# Patient Record
Sex: FEMALE | Race: WHITE | Employment: OTHER | URBAN - METROPOLITAN AREA
[De-identification: names, ages, dates, MRNs, and addresses within clinical notes are randomized per-mention and may not be internally consistent; named-entity substitution may affect disease eponyms.]

---

## 2017-05-04 ENCOUNTER — HOSPITAL ENCOUNTER (OUTPATIENT)
Dept: LAB | Age: 77
Discharge: HOME | End: 2017-05-04

## 2017-09-14 ENCOUNTER — HOSPITAL ENCOUNTER (OUTPATIENT)
Dept: LAB | Age: 77
Discharge: HOME | End: 2017-09-14

## 2017-10-24 ENCOUNTER — HOSPITAL ENCOUNTER (OUTPATIENT)
Dept: MAMMOGRAPHY | Age: 77
Discharge: HOME | End: 2017-10-24
Attending: INTERNAL MEDICINE
Payer: MEDICARE

## 2017-10-24 DIAGNOSIS — Z12.31 VISIT FOR SCREENING MAMMOGRAM: ICD-10-CM

## 2017-10-24 PROCEDURE — 77067 SCR MAMMO BI INCL CAD: CPT

## 2018-03-29 ENCOUNTER — HOSPITAL ENCOUNTER (OUTPATIENT)
Dept: LAB | Age: 78
Discharge: HOME | End: 2018-03-29

## 2018-07-06 ENCOUNTER — HOSPITAL ENCOUNTER (OUTPATIENT)
Dept: LAB | Age: 78
Discharge: HOME | End: 2018-07-06

## 2018-10-16 ENCOUNTER — HOSPITAL ENCOUNTER (OUTPATIENT)
Dept: MAMMOGRAPHY | Age: 78
Discharge: HOME | End: 2018-10-16
Attending: INTERNAL MEDICINE
Payer: MEDICARE

## 2018-10-16 DIAGNOSIS — Z12.31 VISIT FOR SCREENING MAMMOGRAM: ICD-10-CM

## 2018-10-16 PROCEDURE — 77063 BREAST TOMOSYNTHESIS BI: CPT

## 2019-02-07 ENCOUNTER — HOSPITAL ENCOUNTER (OUTPATIENT)
Dept: LAB | Age: 79
Discharge: HOME | End: 2019-02-07

## 2019-05-20 ENCOUNTER — HOSPITAL ENCOUNTER (OUTPATIENT)
Age: 79
Setting detail: OUTPATIENT SURGERY
Discharge: HOME | End: 2019-05-20
Attending: SURGERY | Admitting: SURGERY
Payer: MEDICARE

## 2019-05-20 ENCOUNTER — ANESTHESIA EVENT (OUTPATIENT)
Dept: MEDSURG UNIT | Age: 79
End: 2019-05-20
Payer: MEDICARE

## 2019-05-20 ENCOUNTER — ANESTHESIA (OUTPATIENT)
Dept: MEDSURG UNIT | Age: 79
End: 2019-05-20
Payer: MEDICARE

## 2019-05-20 VITALS
RESPIRATION RATE: 15 BRPM | HEART RATE: 57 BPM | OXYGEN SATURATION: 96 % | HEIGHT: 63 IN | WEIGHT: 106 LBS | BODY MASS INDEX: 18.78 KG/M2 | DIASTOLIC BLOOD PRESSURE: 50 MMHG | TEMPERATURE: 97.7 F | SYSTOLIC BLOOD PRESSURE: 141 MMHG

## 2019-05-20 PROCEDURE — 77030020782 HC GWN BAIR PAWS FLX 3M -B

## 2019-05-20 PROCEDURE — 74011000250 HC RX REV CODE- 250

## 2019-05-20 PROCEDURE — 76030000000 HC AMB SURG OR TIME 0.5 TO 1: Performed by: SURGERY

## 2019-05-20 PROCEDURE — 74011250636 HC RX REV CODE- 250/636

## 2019-05-20 PROCEDURE — 88305 TISSUE EXAM BY PATHOLOGIST: CPT

## 2019-05-20 PROCEDURE — 88331 PATH CONSLTJ SURG 1 BLK 1SPC: CPT

## 2019-05-20 PROCEDURE — 76210000035 HC AMBSU PH I REC 1 TO 1.5 HR: Performed by: SURGERY

## 2019-05-20 PROCEDURE — 74011000250 HC RX REV CODE- 250: Performed by: SURGERY

## 2019-05-20 PROCEDURE — 77030031139 HC SUT VCRL2 J&J -A: Performed by: SURGERY

## 2019-05-20 PROCEDURE — 74011250637 HC RX REV CODE- 250/637: Performed by: ANESTHESIOLOGY

## 2019-05-20 PROCEDURE — 74011250636 HC RX REV CODE- 250/636: Performed by: ANESTHESIOLOGY

## 2019-05-20 PROCEDURE — 77030011640 HC PAD GRND REM COVD -A: Performed by: SURGERY

## 2019-05-20 PROCEDURE — 77030002996 HC SUT SLK J&J -A: Performed by: SURGERY

## 2019-05-20 PROCEDURE — 77030018836 HC SOL IRR NACL ICUM -A: Performed by: SURGERY

## 2019-05-20 PROCEDURE — 76060000061 HC AMB SURG ANES 0.5 TO 1 HR: Performed by: SURGERY

## 2019-05-20 RX ORDER — FENTANYL CITRATE 50 UG/ML
25 INJECTION, SOLUTION INTRAMUSCULAR; INTRAVENOUS
Status: DISCONTINUED | OUTPATIENT
Start: 2019-05-20 | End: 2019-05-20 | Stop reason: HOSPADM

## 2019-05-20 RX ORDER — ONDANSETRON 2 MG/ML
INJECTION INTRAMUSCULAR; INTRAVENOUS AS NEEDED
Status: DISCONTINUED | OUTPATIENT
Start: 2019-05-20 | End: 2019-05-20 | Stop reason: HOSPADM

## 2019-05-20 RX ORDER — SODIUM CHLORIDE, SODIUM LACTATE, POTASSIUM CHLORIDE, CALCIUM CHLORIDE 600; 310; 30; 20 MG/100ML; MG/100ML; MG/100ML; MG/100ML
50 INJECTION, SOLUTION INTRAVENOUS CONTINUOUS
Status: DISCONTINUED | OUTPATIENT
Start: 2019-05-20 | End: 2019-05-20 | Stop reason: HOSPADM

## 2019-05-20 RX ORDER — PROPOFOL 10 MG/ML
INJECTION, EMULSION INTRAVENOUS AS NEEDED
Status: DISCONTINUED | OUTPATIENT
Start: 2019-05-20 | End: 2019-05-20 | Stop reason: HOSPADM

## 2019-05-20 RX ORDER — SODIUM CHLORIDE 0.9 % (FLUSH) 0.9 %
5-40 SYRINGE (ML) INJECTION EVERY 8 HOURS
Status: DISCONTINUED | OUTPATIENT
Start: 2019-05-20 | End: 2019-05-20 | Stop reason: HOSPADM

## 2019-05-20 RX ORDER — LIDOCAINE HYDROCHLORIDE AND EPINEPHRINE 10; 10 MG/ML; UG/ML
INJECTION, SOLUTION INFILTRATION; PERINEURAL AS NEEDED
Status: DISCONTINUED | OUTPATIENT
Start: 2019-05-20 | End: 2019-05-20 | Stop reason: HOSPADM

## 2019-05-20 RX ORDER — MORPHINE SULFATE 10 MG/ML
2 INJECTION, SOLUTION INTRAMUSCULAR; INTRAVENOUS
Status: DISCONTINUED | OUTPATIENT
Start: 2019-05-20 | End: 2019-05-20 | Stop reason: HOSPADM

## 2019-05-20 RX ORDER — BACITRACIN 500 [USP'U]/G
OINTMENT TOPICAL AS NEEDED
Status: DISCONTINUED | OUTPATIENT
Start: 2019-05-20 | End: 2019-05-20 | Stop reason: HOSPADM

## 2019-05-20 RX ORDER — CEFAZOLIN SODIUM 1 G/3ML
INJECTION, POWDER, FOR SOLUTION INTRAMUSCULAR; INTRAVENOUS AS NEEDED
Status: DISCONTINUED | OUTPATIENT
Start: 2019-05-20 | End: 2019-05-20 | Stop reason: HOSPADM

## 2019-05-20 RX ORDER — FENTANYL CITRATE 50 UG/ML
INJECTION, SOLUTION INTRAMUSCULAR; INTRAVENOUS AS NEEDED
Status: DISCONTINUED | OUTPATIENT
Start: 2019-05-20 | End: 2019-05-20 | Stop reason: HOSPADM

## 2019-05-20 RX ORDER — SODIUM CHLORIDE, SODIUM LACTATE, POTASSIUM CHLORIDE, CALCIUM CHLORIDE 600; 310; 30; 20 MG/100ML; MG/100ML; MG/100ML; MG/100ML
INJECTION, SOLUTION INTRAVENOUS
Status: DISCONTINUED | OUTPATIENT
Start: 2019-05-20 | End: 2019-05-20 | Stop reason: HOSPADM

## 2019-05-20 RX ORDER — HYDROMORPHONE HYDROCHLORIDE 1 MG/ML
0.2 INJECTION, SOLUTION INTRAMUSCULAR; INTRAVENOUS; SUBCUTANEOUS
Status: DISCONTINUED | OUTPATIENT
Start: 2019-05-20 | End: 2019-05-20 | Stop reason: HOSPADM

## 2019-05-20 RX ORDER — MIDAZOLAM HYDROCHLORIDE 1 MG/ML
1 INJECTION, SOLUTION INTRAMUSCULAR; INTRAVENOUS AS NEEDED
Status: DISCONTINUED | OUTPATIENT
Start: 2019-05-20 | End: 2019-05-20 | Stop reason: HOSPADM

## 2019-05-20 RX ORDER — DEXMEDETOMIDINE HYDROCHLORIDE 4 UG/ML
INJECTION, SOLUTION INTRAVENOUS AS NEEDED
Status: DISCONTINUED | OUTPATIENT
Start: 2019-05-20 | End: 2019-05-20 | Stop reason: HOSPADM

## 2019-05-20 RX ORDER — SODIUM CHLORIDE 0.9 % (FLUSH) 0.9 %
5-40 SYRINGE (ML) INJECTION AS NEEDED
Status: DISCONTINUED | OUTPATIENT
Start: 2019-05-20 | End: 2019-05-20 | Stop reason: HOSPADM

## 2019-05-20 RX ORDER — ONDANSETRON 2 MG/ML
4 INJECTION INTRAMUSCULAR; INTRAVENOUS AS NEEDED
Status: DISCONTINUED | OUTPATIENT
Start: 2019-05-20 | End: 2019-05-20 | Stop reason: HOSPADM

## 2019-05-20 RX ORDER — CEFAZOLIN SODIUM/WATER 2 G/20 ML
2 SYRINGE (ML) INTRAVENOUS ONCE
Status: DISCONTINUED | OUTPATIENT
Start: 2019-05-20 | End: 2019-05-20 | Stop reason: HOSPADM

## 2019-05-20 RX ORDER — HYDRALAZINE HYDROCHLORIDE 20 MG/ML
5 INJECTION INTRAMUSCULAR; INTRAVENOUS ONCE
Status: COMPLETED | OUTPATIENT
Start: 2019-05-20 | End: 2019-05-20

## 2019-05-20 RX ORDER — TETRACAINE HYDROCHLORIDE 5 MG/ML
SOLUTION OPHTHALMIC AS NEEDED
Status: DISCONTINUED | OUTPATIENT
Start: 2019-05-20 | End: 2019-05-20 | Stop reason: HOSPADM

## 2019-05-20 RX ORDER — PROPOFOL 10 MG/ML
INJECTION, EMULSION INTRAVENOUS
Status: DISCONTINUED | OUTPATIENT
Start: 2019-05-20 | End: 2019-05-20 | Stop reason: HOSPADM

## 2019-05-20 RX ORDER — DEXAMETHASONE SODIUM PHOSPHATE 4 MG/ML
INJECTION, SOLUTION INTRA-ARTICULAR; INTRALESIONAL; INTRAMUSCULAR; INTRAVENOUS; SOFT TISSUE AS NEEDED
Status: DISCONTINUED | OUTPATIENT
Start: 2019-05-20 | End: 2019-05-20 | Stop reason: HOSPADM

## 2019-05-20 RX ORDER — ACETAMINOPHEN 325 MG/1
975 TABLET ORAL ONCE
Status: COMPLETED | OUTPATIENT
Start: 2019-05-20 | End: 2019-05-20

## 2019-05-20 RX ORDER — LIDOCAINE HYDROCHLORIDE 10 MG/ML
0.1 INJECTION, SOLUTION EPIDURAL; INFILTRATION; INTRACAUDAL; PERINEURAL AS NEEDED
Status: DISCONTINUED | OUTPATIENT
Start: 2019-05-20 | End: 2019-05-20 | Stop reason: HOSPADM

## 2019-05-20 RX ADMIN — DEXMEDETOMIDINE HYDROCHLORIDE 4 MCG: 4 INJECTION, SOLUTION INTRAVENOUS at 10:42

## 2019-05-20 RX ADMIN — DEXAMETHASONE SODIUM PHOSPHATE 4 MG: 4 INJECTION, SOLUTION INTRA-ARTICULAR; INTRALESIONAL; INTRAMUSCULAR; INTRAVENOUS; SOFT TISSUE at 11:09

## 2019-05-20 RX ADMIN — PROPOFOL 50 MG: 10 INJECTION, EMULSION INTRAVENOUS at 10:56

## 2019-05-20 RX ADMIN — FENTANYL CITRATE 25 MCG: 50 INJECTION, SOLUTION INTRAMUSCULAR; INTRAVENOUS at 10:46

## 2019-05-20 RX ADMIN — ACETAMINOPHEN 975 MG: 325 TABLET ORAL at 09:38

## 2019-05-20 RX ADMIN — SODIUM CHLORIDE, SODIUM LACTATE, POTASSIUM CHLORIDE, CALCIUM CHLORIDE: 600; 310; 30; 20 INJECTION, SOLUTION INTRAVENOUS at 10:39

## 2019-05-20 RX ADMIN — PROPOFOL 50 MCG/KG/MIN: 10 INJECTION, EMULSION INTRAVENOUS at 10:48

## 2019-05-20 RX ADMIN — DEXMEDETOMIDINE HYDROCHLORIDE 2 MCG: 4 INJECTION, SOLUTION INTRAVENOUS at 11:08

## 2019-05-20 RX ADMIN — PROPOFOL 20 MG: 10 INJECTION, EMULSION INTRAVENOUS at 11:06

## 2019-05-20 RX ADMIN — HYDRALAZINE HYDROCHLORIDE 5 MG: 20 INJECTION INTRAMUSCULAR; INTRAVENOUS at 10:01

## 2019-05-20 RX ADMIN — FENTANYL CITRATE 25 MCG: 50 INJECTION, SOLUTION INTRAMUSCULAR; INTRAVENOUS at 10:50

## 2019-05-20 RX ADMIN — DEXMEDETOMIDINE HYDROCHLORIDE 4 MCG: 4 INJECTION, SOLUTION INTRAVENOUS at 10:40

## 2019-05-20 RX ADMIN — PROPOFOL 75 MCG/KG/MIN: 10 INJECTION, EMULSION INTRAVENOUS at 11:07

## 2019-05-20 RX ADMIN — PROPOFOL 20 MG: 10 INJECTION, EMULSION INTRAVENOUS at 11:03

## 2019-05-20 RX ADMIN — DEXMEDETOMIDINE HYDROCHLORIDE 2 MCG: 4 INJECTION, SOLUTION INTRAVENOUS at 10:55

## 2019-05-20 RX ADMIN — DEXMEDETOMIDINE HYDROCHLORIDE 4 MCG: 4 INJECTION, SOLUTION INTRAVENOUS at 10:59

## 2019-05-20 RX ADMIN — PROPOFOL 20 MG: 10 INJECTION, EMULSION INTRAVENOUS at 10:47

## 2019-05-20 RX ADMIN — DEXMEDETOMIDINE HYDROCHLORIDE 4 MCG: 4 INJECTION, SOLUTION INTRAVENOUS at 10:44

## 2019-05-20 RX ADMIN — ONDANSETRON 4 MG: 2 INJECTION INTRAMUSCULAR; INTRAVENOUS at 11:09

## 2019-05-20 RX ADMIN — CEFAZOLIN SODIUM 2 G: 1 INJECTION, POWDER, FOR SOLUTION INTRAMUSCULAR; INTRAVENOUS at 10:57

## 2019-05-20 NOTE — BRIEF OP NOTE
BRIEF OPERATIVE NOTE Date of Procedure: 5/20/2019 Preoperative Diagnosis: BASAL CELL CARCINOMA RIGHT LOWER EYELID Postoperative Diagnosis: BASAL CELL CARCINOMA RIGHT LOWER EYELID Procedure(s): EXCISION BASAL CELL CARCINOMA RIGHT LOWER EYELID MARGIN  WITH   FROZEN SECTIONS/RECONSTRUCTION WITH LATERAL CANTHOTOMY PEDICLE FLAP CLOSURE Surgeon(s) and Role: * Rebecca Reyes MD - Primary Surgical Assistant: Rui Chandler Surgical Staff: 
Circ-1: Yana Oconnor RN Scrub Tech-1: Suzzane Kawasaki Event Time In Time Out Incision Start  Incision Close 1121 Anesthesia: MAC Estimated Blood Loss: minimal 
Specimens:  
ID Type Source Tests Collected by Time Destination 1 : BCCA RIGHT LOWER LID Frozen Section Tissue  Rebecca Reyes MD 5/20/2019 1392 Pathology Findings: nodular BCCA with touching lateral margin at skin level, but this was wear the skin tore taking at the tumor. Gross lateral margin clear by 2 mm. Tarsal margin clear. Complications: none Implants: none

## 2019-05-20 NOTE — ROUTINE PROCESS
Patient: Kimberley Mancilla MRN: [id number]  SSN: [id number] YOB: 1940  Age: 78 y.o. Sex: female Patient is status post Procedure(s): EXCISION BASAL CELL CARCINOMA RIGHT LOWER EYELID MARGINS WITH FULL THICKNESS SKIN GRAFT AND FROZEN SECTIONS/RECONSTRUCTION WITH LATERAL CANTHOTOMY PEDICLE FLAP CLOSURE. Surgeon(s) and Role: * Dina Lerma MD - Primary Local/Dose/Irrigation:  SEE MAR Dressing/Packing:      
Splint/Cast:  ] Other:

## 2019-05-20 NOTE — ANESTHESIA POSTPROCEDURE EVALUATION
Probably left corneal abrasion. Left sclera red and eye teary after colonoscopy. ordered Dex/tobra ointment to left eye and tylenol. Told her if symptoms do not improve in 2 days then come to ER. Post-Anesthesia Evaluation and Assessment Patient: Marybel Hager MRN: [id number]  SSN: [id number] YOB: 1940  Age: 78 y.o. Sex: female I have evaluated the patient and they are stable and ready for discharge from the PACU. Cardiovascular Function/Vital Signs Visit Vitals /41 Pulse (!) 52 Temp 36.5 °C (97.7 °F) Resp 15 Ht 5' 3\" (1.6 m) Wt 48.1 kg (106 lb) SpO2 99% BMI 18.78 kg/m² Patient is status post MAC anesthesia for Procedure(s): EXCISION BASAL CELL CARCINOMA RIGHT LOWER EYELID MARGIN  WITH   FROZEN SECTIONS/RECONSTRUCTION WITH LATERAL CANTHOTOMY PEDICLE FLAP CLOSURE. Nausea/Vomiting: None Postoperative hydration reviewed and adequate. Pain: 
Pain Scale 1: Numeric (0 - 10) (05/20/19 1145) Pain Intensity 1: 0 (05/20/19 1145) Managed Neurological Status:  
Neuro (WDL): Exceptions to WDL (05/20/19 1145) Neuro Neurologic State: Anesthetized (05/20/19 1145) At baseline Mental Status, Level of Consciousness: Alert and  oriented to person, place, and time Pulmonary Status:  
O2 Device: Nasal cannula (05/20/19 1200) Adequate oxygenation and airway patent Complications related to anesthesia: None Post-anesthesia assessment completed. No concerns Signed By: Camden Hill MD   
 May 20, 2019 Procedure(s): EXCISION BASAL CELL CARCINOMA RIGHT LOWER EYELID MARGIN  WITH   FROZEN SECTIONS/RECONSTRUCTION WITH LATERAL CANTHOTOMY PEDICLE FLAP CLOSURE. MAC 
 
<BSHSIANPOST> Vitals Value Taken Time /44 5/20/2019 12:15 PM  
Temp 36.5 °C (97.7 °F) 5/20/2019 11:50 AM  
Pulse 60 5/20/2019 12:27 PM  
Resp 14 5/20/2019 12:27 PM  
SpO2 98 % 5/20/2019 12:27 PM  
Vitals shown include unvalidated device data.

## 2019-05-20 NOTE — H&P
Pre-op History and Physical 
 
CC: BASAL CELL CARCINOMA HPI: 78y.o. year old female with BASAL CELL CARCINOMA for Procedure(s): EXCISION BASAL CELL CARCINOMA RIGHT LOWER EYELID MARGINS WITH FULL THICKNESS SKIN GRAFT AND FROZEN SECTIONS/RECONSTRUCTION WITH LATERAL CANTHOTOMY PEDICLE FLAP CLOSURE. Past medical history:  
Past Medical History:  
Diagnosis Date  Cancer ([facility] BCCA; [facility]  High cholesterol  History of colon polyps  HTN (hypertension) 7/16/2013  IBS (irritable bowel syndrome)  Psychiatric disorder HX PANIC ATTACKS  PVD (peripheral vascular disease) ([facility] 7/16/2013 Past surgical history:  
Past Surgical History:  
Procedure Laterality Date  HX BREAST BIOPSY Bilateral   
 Surgical Bx - Many yrs ago per pt. - BENIGN  
 HX CATARACT REMOVAL Bilateral   
 HX COLONOSCOPY SEVERAL  
 HX TONSILLECTOMY  HX TOTAL ABDOMINAL HYSTERECTOMY    
 only  VASCULAR SURGERY PROCEDURE UNLIST Right 12/2013 RIGHT LOWER LEG STENT  VASCULAR SURGERY PROCEDURE UNLIST Right 7-2014  
 bypass graft right leg Family history:  
Family History Problem Relation Age of Onset  Cancer Son BURKITT LYMPHOMA  Hypertension Mother  Cancer Father COLON  
 Diabetes Maternal Uncle  Cancer Sister SKIN  
 Stroke Sister  Cancer Sister SKIN  
 Anesth Problems Neg Hx Social history:  
Social History Socioeconomic History  Marital status:  Spouse name: Not on file  Number of children: Not on file  Years of education: Not on file  Highest education level: Not on file Occupational History  Not on file Social Needs  Financial resource strain: Not on file  Food insecurity:  
  Worry: Not on file Inability: Not on file  Transportation needs:  
  Medical: Not on file Non-medical: Not on file Tobacco Use  Smoking status: Current Every Day Smoker Packs/day: 0.50 Years: 35.00 Pack years: 17.50  Smokeless tobacco: Never Used Substance and Sexual Activity  Alcohol use: Yes Comment: VERY OCC  Drug use: No  
 Sexual activity: Not on file Lifestyle  Physical activity:  
  Days per week: Not on file Minutes per session: Not on file  Stress: Not on file Relationships  Social connections:  
  Talks on phone: Not on file Gets together: Not on file Attends Anabaptism service: Not on file Active member of club or organization: Not on file Attends meetings of clubs or organizations: Not on file Relationship status: Not on file  Intimate partner violence:  
  Fear of current or ex partner: Not on file Emotionally abused: Not on file Physically abused: Not on file Forced sexual activity: Not on file Other Topics Concern  Not on file Social History Narrative ** Merged History Encounter ** Home Medications:  
Prior to Admission medications Medication Sig Start Date End Date Taking? Authorizing Provider  
losartan (COZAAR) 50 mg tablet Take 1 Tab by mouth daily. 4/10/19  Yes Aung Hu MD  
atorvastatin (LIPITOR) 10 mg tablet TAKE 1 TABLET DAILY 1/30/19  Yes Aung Hu MD  
aspirin (ASPIRIN) 325 mg tablet Take 325 mg by mouth nightly. Provider, Historical  
  
Allergies: Allergies Allergen Reactions  Ace Inhibitors Cough Patient states that she is \"unaware of this allergy\"  Indocin [Indomethacin] Palpitations  Indocin [Indomethacin] Unknown (comments)  Nsaids (Non-Steroidal Anti-Inflammatory Drug) Other (comments) \"Make her feel funny\"  Sulfa (Sulfonamide Antibiotics) Palpitations  Sulfa Dyne Other (comments) ALMOST PASSED OUT, pt not aware of this allergy Review of systems:  Denies headache, fever, chills, weight change, congestion, sore throat, chest pain, shortness of breath, nausea, vomiting, diarrhea, constipation, abdominal pain, generalized weakness, muscle or joint pain, and rash. Physical Exam: 
Vitals: Blood pressure 192/73, pulse 68, temperature 97.9 °F (36.6 °C), resp. rate 18, height 5' 3\" (1.6 m), weight 48.1 kg (106 lb), SpO2 98 %. General: awake and alert, NAD Neck: supple Breasts:  no known breast pathology Cor: RRR Lungs: clear Abdomen: soft, non-tender, non-distended Extremities: no edema Skin: basal cell carcinoma of right lower eyelid. Obvious based on exam. 
 
Impression: BASAL CELL CARCINOMA Plan:  Procedure(s): EXCISION BASAL CELL CARCINOMA RIGHT LOWER EYELID MARGINS WITH FULL THICKNESS SKIN GRAFT AND FROZEN SECTIONS/RECONSTRUCTION WITH LATERAL CANTHOTOMY PEDICLE FLAP CLOSURE

## 2019-05-20 NOTE — DISCHARGE INSTRUCTIONS
Parish Harrington M.D., F.CLIVE.C.S. Hayde Kelly M.D., RONALD.CLIVE.C.S.,  Elva Clancy III, M.D., F.A.C.S. Sudhir Hebert M.D.,  Ratna Sidhu M.D. Instructions after Minor Plastic Surgery Procedures      You have had a minor surgical procedure. Please follow these simple instructions to help ensure a safe and comfortable recovery. Any questions can be directed to the office at [phone]. 1. If a bandage has been placed, it can be removed or changed at 24-48 hours after surgery. Wounds of the scalp are not usually bandaged, except in special situations. 2. Expect a modest amount of redness (inflammation) and possibly some bruising to appear in the days following your surgery. This is normal and will resolve as the wound heals, but may persist until the stitches have been removed. 3. The appearance of excessive wound redness, pus or fever is not normal and should be reported to the office. 4. Wounds may be gently washed with mild soap and water beginning 24 hours after surgery, then patted dry. Scalp wounds may be gently washed (mild shampoo) and gently dried as well. 5. Antibiotic eye ointment should be lightly applied 2-3 times per day to your incision. Replace Band-Aid or other dressing as instructed. 6. Suture removal will be arranged in 5-14 days, depending upon the site. The pathologists report will be discussed with you then. Your appointment is ___Saturday at the [facility] Office____________________. 7. Mild to moderate pain is to be expected after minor surgery and will generally be relieved by the use of Tylenol or ibuprofen agents (Advil, Motrin, Nuprin, etc.)     8. Swelling or discomfort will be improved by elevating the surgical site above the level of the heart, especially the face, scalp or arms, which can be elevated in bed by extra pillows.     9. Do not be concerned if one or even several sutures come out prior to the time of suture removal. It is not unusual for this to occur as the wound swelling decreases. Support of the remaining sutures is sufficient to protect your wound(s). 10. If appropriate, copies of the surgery and pathology reports will be sent to your doctor. 11. Please call the office at [phone] if you have any questions. I acknowledge receipt of the above discharge instructions:    Patient/Guardian Signature _______________________________________ Date___________________    Dahiana Ramirez Signature_______________________________________________ Date___________________      DISCHARGE SUMMARY from Nurse    PATIENT INSTRUCTIONS:    After general anesthesia or intravenous sedation, for 24 hours or while taking prescription Narcotics:  · Limit your activities  · Do not drive and operate hazardous machinery  · Do not make important personal or business decisions  · Do  not drink alcoholic beverages  · If you have not urinated within 8 hours after discharge, please contact your surgeon on call. Report the following to your surgeon:  · Excessive pain, swelling, redness or odor of or around the surgical area  · Temperature over 100.5  · Nausea and vomiting lasting longer than 4 hours or if unable to take medications  · Any signs of decreased circulation or nerve impairment to extremity: change in color, persistent  numbness, tingling, coldness or increase pain  · Any questions    What to do at Home:  Recommended activity: See surgical instructions. If you experience any of the following symptoms as noted above, please follow up with Dr. Lety Monet. *  Please give a list of your current medications to your Primary Care Provider. *  Please update this list whenever your medications are discontinued, doses are      changed, or new medications (including over-the-counter products) are added. *  Please carry medication information at all times in case of emergency situations.     These are general instructions for a healthy lifestyle:    No smoking/ No tobacco products/ Avoid exposure to second hand smoke  Surgeon General's Warning:  Quitting smoking now greatly reduces serious risk to your health. Obesity, smoking, and sedentary lifestyle greatly increases your risk for illness    A healthy diet, regular physical exercise & weight monitoring are important for maintaining a healthy lifestyle    You may be retaining fluid if you have a history of heart failure or if you experience any of the following symptoms:  Weight gain of 3 pounds or more overnight or 5 pounds in a week, increased swelling in our hands or feet or shortness of breath while lying flat in bed. Please call your doctor as soon as you notice any of these symptoms; do not wait until your next office visit. Recognize signs and symptoms of STROKE:    F-face looks uneven    A-arms unable to move or move unevenly    S-speech slurred or non-existent    T-time-call 911 as soon as signs and symptoms begin-DO NOT go       Back to bed or wait to see if you get better-TIME IS BRAIN. Warning Signs of HEART ATTACK     Call 911 if you have these symptoms:   Chest discomfort. Most heart attacks involve discomfort in the center of the chest that lasts more than a few minutes, or that goes away and comes back. It can feel like uncomfortable pressure, squeezing, fullness, or pain.  Discomfort in other areas of the upper body. Symptoms can include pain or discomfort in one or both arms, the back, neck, jaw, or stomach.  Shortness of breath with or without chest discomfort.  Other signs may include breaking out in a cold sweat, nausea, or lightheadedness. Don't wait more than five minutes to call 911 - MINUTES MATTER! Fast action can save your life. Calling 911 is almost always the fastest way to get lifesaving treatment. Emergency Medical Services staff can begin treatment when they arrive -- up to an hour sooner than if someone gets to the hospital by car.      The discharge information has been reviewed with the patient and caregiver. The patient and caregiver verbalized understanding. Discharge medications reviewed with the patient and caregiver and appropriate educational materials and side effects teaching were provided.   ___________________________________________________________________________________________________________________________________

## 2019-05-20 NOTE — ANESTHESIA PREPROCEDURE EVALUATION
Relevant Problems No relevant active problems Anesthetic History No history of anesthetic complications Review of Systems / Medical History Patient summary reviewed, nursing notes reviewed and pertinent labs reviewed Pulmonary Within defined limits Neuro/Psych Psychiatric history Cardiovascular Hypertension Exercise tolerance: >4 METS 
  
GI/Hepatic/Renal 
Within defined limits Endo/Other Within defined limits Other Findings Physical Exam 
 
Airway Mallampati: I 
TM Distance: > 6 cm Neck ROM: normal range of motion Mouth opening: Normal 
 
 Cardiovascular Rhythm: regular Rate: normal 
 
 
 
 Dental 
No notable dental hx Pulmonary Breath sounds clear to auscultation Abdominal 
 
 
 
 Other Findings Anesthetic Plan ASA: 2 Anesthesia type: MAC Induction: Intravenous Anesthetic plan and risks discussed with: Patient

## 2019-10-04 PROBLEM — Z72.0 TOBACCO ABUSE: Status: ACTIVE | Noted: 2019-10-04

## 2019-10-17 ENCOUNTER — HOSPITAL ENCOUNTER (OUTPATIENT)
Dept: MAMMOGRAPHY | Age: 79
Discharge: HOME | End: 2019-10-17
Attending: INTERNAL MEDICINE
Payer: MEDICARE

## 2019-10-17 DIAGNOSIS — Z12.31 VISIT FOR SCREENING MAMMOGRAM: ICD-10-CM

## 2019-10-17 PROCEDURE — 77067 SCR MAMMO BI INCL CAD: CPT

## 2020-01-03 RX ORDER — ASPIRIN 81 MG/1
81 TABLET ORAL DAILY
COMMUNITY

## 2020-01-06 ENCOUNTER — HOSPITAL ENCOUNTER (OUTPATIENT)
Age: 80
Setting detail: OUTPATIENT SURGERY
Discharge: HOME | End: 2020-01-06
Attending: SURGERY | Admitting: SURGERY
Payer: MEDICARE

## 2020-01-06 ENCOUNTER — ANESTHESIA (OUTPATIENT)
Dept: MEDSURG UNIT | Age: 80
End: 2020-01-06
Payer: MEDICARE

## 2020-01-06 ENCOUNTER — ANESTHESIA EVENT (OUTPATIENT)
Dept: MEDSURG UNIT | Age: 80
End: 2020-01-06
Payer: MEDICARE

## 2020-01-06 VITALS
TEMPERATURE: 98 F | HEART RATE: 82 BPM | DIASTOLIC BLOOD PRESSURE: 52 MMHG | SYSTOLIC BLOOD PRESSURE: 109 MMHG | RESPIRATION RATE: 23 BRPM | OXYGEN SATURATION: 93 % | HEIGHT: 63 IN | WEIGHT: 106.7 LBS | BODY MASS INDEX: 18.91 KG/M2

## 2020-01-06 PROCEDURE — 74011250636 HC RX REV CODE- 250/636: Performed by: ANESTHESIOLOGY

## 2020-01-06 PROCEDURE — 74011000250 HC RX REV CODE- 250: Performed by: NURSE ANESTHETIST, CERTIFIED REGISTERED

## 2020-01-06 PROCEDURE — 74011250636 HC RX REV CODE- 250/636: Performed by: SURGERY

## 2020-01-06 PROCEDURE — 77030040361 HC SLV COMPR DVT MDII -B: Performed by: SURGERY

## 2020-01-06 PROCEDURE — 77030040922 HC BLNKT HYPOTHRM STRY -A

## 2020-01-06 PROCEDURE — 77030018836 HC SOL IRR NACL ICUM -A: Performed by: SURGERY

## 2020-01-06 PROCEDURE — 76060000062 HC AMB SURG ANES 1 TO 1.5 HR: Performed by: SURGERY

## 2020-01-06 PROCEDURE — 88331 PATH CONSLTJ SURG 1 BLK 1SPC: CPT

## 2020-01-06 PROCEDURE — 74011000258 HC RX REV CODE- 258: Performed by: SURGERY

## 2020-01-06 PROCEDURE — 76030000001 HC AMB SURG OR TIME 1 TO 1.5: Performed by: SURGERY

## 2020-01-06 PROCEDURE — 74011000250 HC RX REV CODE- 250: Performed by: SURGERY

## 2020-01-06 PROCEDURE — 77030002933 HC SUT MCRYL J&J -A: Performed by: SURGERY

## 2020-01-06 PROCEDURE — 77030011640 HC PAD GRND REM COVD -A: Performed by: SURGERY

## 2020-01-06 PROCEDURE — 88305 TISSUE EXAM BY PATHOLOGIST: CPT

## 2020-01-06 PROCEDURE — 74011250637 HC RX REV CODE- 250/637: Performed by: ANESTHESIOLOGY

## 2020-01-06 PROCEDURE — 77030002996 HC SUT SLK J&J -A: Performed by: SURGERY

## 2020-01-06 PROCEDURE — 77030031139 HC SUT VCRL2 J&J -A: Performed by: SURGERY

## 2020-01-06 PROCEDURE — 77030010507 HC ADH SKN DERMBND J&J -B: Performed by: SURGERY

## 2020-01-06 PROCEDURE — 76210000034 HC AMBSU PH I REC 0.5 TO 1 HR: Performed by: SURGERY

## 2020-01-06 PROCEDURE — 74011250636 HC RX REV CODE- 250/636: Performed by: NURSE ANESTHETIST, CERTIFIED REGISTERED

## 2020-01-06 RX ORDER — LIDOCAINE HYDROCHLORIDE AND EPINEPHRINE 10; 10 MG/ML; UG/ML
INJECTION, SOLUTION INFILTRATION; PERINEURAL AS NEEDED
Status: DISCONTINUED | OUTPATIENT
Start: 2020-01-06 | End: 2020-01-06 | Stop reason: HOSPADM

## 2020-01-06 RX ORDER — OXYCODONE HYDROCHLORIDE 5 MG/1
5 TABLET ORAL AS NEEDED
Status: DISCONTINUED | OUTPATIENT
Start: 2020-01-06 | End: 2020-01-06 | Stop reason: HOSPADM

## 2020-01-06 RX ORDER — SODIUM CHLORIDE 0.9 % (FLUSH) 0.9 %
5-40 SYRINGE (ML) INJECTION AS NEEDED
Status: DISCONTINUED | OUTPATIENT
Start: 2020-01-06 | End: 2020-01-06 | Stop reason: HOSPADM

## 2020-01-06 RX ORDER — SODIUM CHLORIDE 9 MG/ML
25 INJECTION, SOLUTION INTRAVENOUS CONTINUOUS
Status: DISCONTINUED | OUTPATIENT
Start: 2020-01-06 | End: 2020-01-06 | Stop reason: HOSPADM

## 2020-01-06 RX ORDER — SODIUM CHLORIDE 0.9 % (FLUSH) 0.9 %
5-40 SYRINGE (ML) INJECTION EVERY 8 HOURS
Status: DISCONTINUED | OUTPATIENT
Start: 2020-01-06 | End: 2020-01-06 | Stop reason: HOSPADM

## 2020-01-06 RX ORDER — GLYCOPYRROLATE 0.2 MG/ML
INJECTION INTRAMUSCULAR; INTRAVENOUS AS NEEDED
Status: DISCONTINUED | OUTPATIENT
Start: 2020-01-06 | End: 2020-01-06 | Stop reason: HOSPADM

## 2020-01-06 RX ORDER — DEXMEDETOMIDINE HYDROCHLORIDE 100 UG/ML
INJECTION, SOLUTION INTRAVENOUS AS NEEDED
Status: DISCONTINUED | OUTPATIENT
Start: 2020-01-06 | End: 2020-01-06 | Stop reason: HOSPADM

## 2020-01-06 RX ORDER — HYDROMORPHONE HYDROCHLORIDE 1 MG/ML
0.2 INJECTION, SOLUTION INTRAMUSCULAR; INTRAVENOUS; SUBCUTANEOUS
Status: DISCONTINUED | OUTPATIENT
Start: 2020-01-06 | End: 2020-01-06 | Stop reason: HOSPADM

## 2020-01-06 RX ORDER — MIDAZOLAM HYDROCHLORIDE 1 MG/ML
1 INJECTION, SOLUTION INTRAMUSCULAR; INTRAVENOUS AS NEEDED
Status: DISCONTINUED | OUTPATIENT
Start: 2020-01-06 | End: 2020-01-06 | Stop reason: HOSPADM

## 2020-01-06 RX ORDER — ESMOLOL HYDROCHLORIDE 10 MG/ML
INJECTION INTRAVENOUS AS NEEDED
Status: DISCONTINUED | OUTPATIENT
Start: 2020-01-06 | End: 2020-01-06 | Stop reason: HOSPADM

## 2020-01-06 RX ORDER — PROPOFOL 10 MG/ML
INJECTION, EMULSION INTRAVENOUS AS NEEDED
Status: DISCONTINUED | OUTPATIENT
Start: 2020-01-06 | End: 2020-01-06 | Stop reason: HOSPADM

## 2020-01-06 RX ORDER — FENTANYL CITRATE 50 UG/ML
25 INJECTION, SOLUTION INTRAMUSCULAR; INTRAVENOUS
Status: DISCONTINUED | OUTPATIENT
Start: 2020-01-06 | End: 2020-01-06 | Stop reason: HOSPADM

## 2020-01-06 RX ORDER — MIDAZOLAM HYDROCHLORIDE 1 MG/ML
INJECTION, SOLUTION INTRAMUSCULAR; INTRAVENOUS AS NEEDED
Status: DISCONTINUED | OUTPATIENT
Start: 2020-01-06 | End: 2020-01-06 | Stop reason: HOSPADM

## 2020-01-06 RX ORDER — LIDOCAINE HYDROCHLORIDE 10 MG/ML
0.1 INJECTION, SOLUTION EPIDURAL; INFILTRATION; INTRACAUDAL; PERINEURAL AS NEEDED
Status: DISCONTINUED | OUTPATIENT
Start: 2020-01-06 | End: 2020-01-06 | Stop reason: HOSPADM

## 2020-01-06 RX ORDER — MIDAZOLAM HYDROCHLORIDE 1 MG/ML
0.5 INJECTION, SOLUTION INTRAMUSCULAR; INTRAVENOUS
Status: DISCONTINUED | OUTPATIENT
Start: 2020-01-06 | End: 2020-01-06 | Stop reason: HOSPADM

## 2020-01-06 RX ORDER — MORPHINE SULFATE 10 MG/ML
2 INJECTION, SOLUTION INTRAMUSCULAR; INTRAVENOUS
Status: DISCONTINUED | OUTPATIENT
Start: 2020-01-06 | End: 2020-01-06 | Stop reason: HOSPADM

## 2020-01-06 RX ORDER — SODIUM CHLORIDE, SODIUM LACTATE, POTASSIUM CHLORIDE, CALCIUM CHLORIDE 600; 310; 30; 20 MG/100ML; MG/100ML; MG/100ML; MG/100ML
125 INJECTION, SOLUTION INTRAVENOUS CONTINUOUS
Status: DISCONTINUED | OUTPATIENT
Start: 2020-01-06 | End: 2020-01-06 | Stop reason: HOSPADM

## 2020-01-06 RX ORDER — EPHEDRINE SULFATE/0.9% NACL/PF 50 MG/5 ML
SYRINGE (ML) INTRAVENOUS AS NEEDED
Status: DISCONTINUED | OUTPATIENT
Start: 2020-01-06 | End: 2020-01-06 | Stop reason: HOSPADM

## 2020-01-06 RX ORDER — ONDANSETRON 2 MG/ML
4 INJECTION INTRAMUSCULAR; INTRAVENOUS AS NEEDED
Status: DISCONTINUED | OUTPATIENT
Start: 2020-01-06 | End: 2020-01-06 | Stop reason: HOSPADM

## 2020-01-06 RX ORDER — FENTANYL CITRATE 50 UG/ML
50 INJECTION, SOLUTION INTRAMUSCULAR; INTRAVENOUS AS NEEDED
Status: DISCONTINUED | OUTPATIENT
Start: 2020-01-06 | End: 2020-01-06 | Stop reason: HOSPADM

## 2020-01-06 RX ORDER — SODIUM CHLORIDE, SODIUM LACTATE, POTASSIUM CHLORIDE, CALCIUM CHLORIDE 600; 310; 30; 20 MG/100ML; MG/100ML; MG/100ML; MG/100ML
25 INJECTION, SOLUTION INTRAVENOUS CONTINUOUS
Status: DISCONTINUED | OUTPATIENT
Start: 2020-01-06 | End: 2020-01-06 | Stop reason: HOSPADM

## 2020-01-06 RX ORDER — ACETAMINOPHEN 325 MG/1
650 TABLET ORAL ONCE
Status: COMPLETED | OUTPATIENT
Start: 2020-01-06 | End: 2020-01-06

## 2020-01-06 RX ORDER — PROPOFOL 10 MG/ML
INJECTION, EMULSION INTRAVENOUS
Status: DISCONTINUED | OUTPATIENT
Start: 2020-01-06 | End: 2020-01-06 | Stop reason: HOSPADM

## 2020-01-06 RX ORDER — DIPHENHYDRAMINE HYDROCHLORIDE 50 MG/ML
12.5 INJECTION, SOLUTION INTRAMUSCULAR; INTRAVENOUS AS NEEDED
Status: DISCONTINUED | OUTPATIENT
Start: 2020-01-06 | End: 2020-01-06 | Stop reason: HOSPADM

## 2020-01-06 RX ADMIN — CEFAZOLIN 1 G: 1 INJECTION, POWDER, FOR SOLUTION INTRAMUSCULAR; INTRAVENOUS at 09:15

## 2020-01-06 RX ADMIN — Medication 10 MG: at 09:30

## 2020-01-06 RX ADMIN — PROPOFOL 75 MCG/KG/MIN: 10 INJECTION, EMULSION INTRAVENOUS at 09:09

## 2020-01-06 RX ADMIN — ESMOLOL HYDROCHLORIDE 30 MG: 10 INJECTION, SOLUTION INTRAVENOUS at 09:36

## 2020-01-06 RX ADMIN — PROPOFOL 25 MG: 10 INJECTION, EMULSION INTRAVENOUS at 09:10

## 2020-01-06 RX ADMIN — PROPOFOL 25 MG: 10 INJECTION, EMULSION INTRAVENOUS at 09:21

## 2020-01-06 RX ADMIN — PROPOFOL 50 MG: 10 INJECTION, EMULSION INTRAVENOUS at 09:16

## 2020-01-06 RX ADMIN — PROPOFOL 25 MG: 10 INJECTION, EMULSION INTRAVENOUS at 09:53

## 2020-01-06 RX ADMIN — SODIUM CHLORIDE, SODIUM LACTATE, POTASSIUM CHLORIDE, AND CALCIUM CHLORIDE 25 ML/HR: 600; 310; 30; 20 INJECTION, SOLUTION INTRAVENOUS at 08:25

## 2020-01-06 RX ADMIN — DEXMEDETOMIDINE HYDROCHLORIDE 6 MCG: 100 INJECTION, SOLUTION, CONCENTRATE INTRAVENOUS at 09:17

## 2020-01-06 RX ADMIN — GLYCOPYRROLATE 0.2 MG: 0.2 INJECTION INTRAMUSCULAR; INTRAVENOUS at 09:17

## 2020-01-06 RX ADMIN — MIDAZOLAM 2 MG: 1 INJECTION INTRAMUSCULAR; INTRAVENOUS at 09:25

## 2020-01-06 RX ADMIN — DEXMEDETOMIDINE HYDROCHLORIDE 6 MCG: 100 INJECTION, SOLUTION, CONCENTRATE INTRAVENOUS at 09:11

## 2020-01-06 RX ADMIN — PROPOFOL 25 MG: 10 INJECTION, EMULSION INTRAVENOUS at 09:43

## 2020-01-06 RX ADMIN — DEXMEDETOMIDINE HYDROCHLORIDE 6 MCG: 100 INJECTION, SOLUTION, CONCENTRATE INTRAVENOUS at 09:07

## 2020-01-06 RX ADMIN — PROPOFOL: 10 INJECTION, EMULSION INTRAVENOUS at 09:45

## 2020-01-06 RX ADMIN — ESMOLOL HYDROCHLORIDE 30 MG: 10 INJECTION, SOLUTION INTRAVENOUS at 09:32

## 2020-01-06 RX ADMIN — PROPOFOL 25 MG: 10 INJECTION, EMULSION INTRAVENOUS at 09:55

## 2020-01-06 RX ADMIN — DEXMEDETOMIDINE HYDROCHLORIDE 6 MCG: 100 INJECTION, SOLUTION, CONCENTRATE INTRAVENOUS at 09:20

## 2020-01-06 RX ADMIN — ESMOLOL HYDROCHLORIDE 40 MG: 10 INJECTION, SOLUTION INTRAVENOUS at 09:39

## 2020-01-06 RX ADMIN — ACETAMINOPHEN 650 MG: 325 TABLET ORAL at 08:16

## 2020-01-06 RX ADMIN — DEXMEDETOMIDINE HYDROCHLORIDE 6 MCG: 100 INJECTION, SOLUTION, CONCENTRATE INTRAVENOUS at 09:14

## 2020-01-06 NOTE — ANESTHESIA POSTPROCEDURE EVALUATION
Post-Anesthesia Evaluation and Assessment    Patient: Lux Mcneill MRN: [id number]  SSN: [id number]    YOB: 1940  Age: 78 y.o. Sex: female       Cardiovascular Function/Vital Signs  Visit Vitals  /69   Pulse 96   Temp 36.7 °C (98 °F)   Resp 20   Ht 5' 3\" (1.6 m)   Wt 48.4 kg (106 lb 11.2 oz)   SpO2 96%   BMI 18.90 kg/m²       Patient is status post MAC anesthesia for Procedure(s):  PROBABLE SQUAMOUS CELL CARCINOMA LEFT FOREARM AND LEFT UPPER ARM WITH FROZEN SECTIONS, FLAP CLOSURE AND REPAIR. Nausea/Vomiting: None    Postoperative hydration reviewed and adequate. Pain:  Pain Scale 1: Numeric (0 - 10) (01/06/20 0758)  Pain Intensity 1: 0 (01/06/20 0758)   Managed    Neurological Status:   Neuro (WDL): Within Defined Limits (01/06/20 0815)   At baseline    Mental Status and Level of Consciousness: Alert and oriented to person, place, and time    Pulmonary Status:   O2 Device: CO2 nasal cannula (01/06/20 1016)   Adequate oxygenation and airway patent    Complications related to anesthesia: None    Post-anesthesia assessment completed. No concerns    Signed By: Nestor Fofana MD     January 6, 2020              Procedure(s):  PROBABLE SQUAMOUS CELL CARCINOMA LEFT FOREARM AND LEFT UPPER ARM WITH FROZEN SECTIONS, FLAP CLOSURE AND REPAIR. MAC    <BSHSIANPOST>    Vitals Value Taken Time   BP 98/44 1/6/2020 10:30 AM   Temp 36.7 °C (98 °F) 1/6/2020 10:16 AM   Pulse 89 1/6/2020 10:37 AM   Resp 21 1/6/2020 10:37 AM   SpO2 93 % 1/6/2020 10:37 AM   Vitals shown include unvalidated device data.

## 2020-01-06 NOTE — OP NOTES
[address]   OPERATIVE REPORT    Name:  Vance Yanes  MR#:  [id number]  :  1940  ACCOUNT #:  [de-identified]  DATE OF SERVICE:  2020      PREOPERATIVE DIAGNOSES:  1.  Multifocal 4 x 2 cm squamous cell carcinoma of the dorsal aspect of the left forearm. 2.  1.5 x 1.5 cm probable basal cell carcinoma of the upper left forearm. POSTOPERATIVE DIAGNOSES:  1.  Multifocal 4 x 2 cm squamous cell carcinoma of the dorsal aspect of the left forearm. 2.  1.5 x 1.5 cm probable basal cell carcinoma of the upper left forearm. PROCEDURES PERFORMED:  1. Excision squamous cell carcinoma of dorsal left forearm with 5 mm margins and flap closure of 12 cm2 forearm defect. 2.  Excision probable basal cell carcinoma of the left upper arm with intermediate repair of 3 cm upper arm wound. SURGEON:  Nancy Martinez MD    ASSISTANT:  none    ANESTHESIA:  Sedation anesthesia. COMPLICATIONS:  none. SPECIMENS REMOVED:  See op note. IMPLANTS:  none. ESTIMATED BLOOD LOSS:  Negligible. INDICATIONS:  This 78year-old patient was brought to the operating room today for surgical treatment of these 2 probable skin cancers. I have treated her for multiple skin cancers over the course of her life. Preoperatively, she was marked for surgery and the details of the planned procedure were discussed with her including the scars which would result and the risk involved. She appeared to understand all these considerations. FINDINGS AND PROCEDURE:  The patient was brought to the operating room and sedation anesthesia was induced. Local anesthesia was induced in both surgical sites with 1% lidocaine 1:100,000 epinephrine. The area was then prepped and draped into a sterile field. I began the procedure with an excision of the squamous cell of the forearm. This was in a circular S-shaped manner. This was sent for frozen section.   While I was awaiting that, the probable basal cell of the upper arm was elliptically excised and sent for permanent pathologic examination. After careful check for hemostasis, closure of the upper arm wound was accomplished with interrupted 3-0 Vicryl in the deep dermis with buried knots and running subcuticular 4-0 Monocryl in the skin. At this point, the frozen section on the forearm returned squamous cell carcinoma with clear margins. A proximally based flap of the forearm skin was designed, cut, raised and brought into position. After careful skin trimming and tissue rearrangement, hemostasis was secured and the wounds were closed with interrupted 3-0 Vicryl in the deep dermis with buried knots and running subcuticular 4-0 Monocryl on the skin. Dermabond glue was applied to both incisions. The patient had sterile dressings applied. She awoke from the anesthetic and went to the recovery room in good condition. Final sponge and needle counts were reported to be correct. Blood loss for this operation was negligible.         ISAAC Hilaria Gaucher III, MD [id number]  D:  01/06/2020 11:05  T:  01/06/2020 12:23  JOB #:  [id number]

## 2020-01-06 NOTE — H&P
Pre-op History and Physical    CC: NEOPLASM OF UNCERTAIN BEHAVIOR   HPI: 78y.o. year old female with NEOPLASM OF UNCERTAIN BEHAVIOR for Procedure(s):  PROBABLE SQUAMOUS CELL CARCINOMA LEFT FOREARM AND LEFT UPPER ARM WITH FROZEN SECTIONS, FLAP CLOSURE AND REPAIR. Past medical history:   Past Medical History:   Diagnosis Date    Cancer ([facility]     BCCA; SCCA    High cholesterol     History of colon polyps     HTN (hypertension) 7/16/2013    IBS (irritable bowel syndrome)     Psychiatric disorder     HX PANIC ATTACKS    PVD (peripheral vascular disease) ([facility] 7/16/2013      Past surgical history:   Past Surgical History:   Procedure Laterality Date    HX BREAST BIOPSY Bilateral     Surgical Bx - Many yrs ago per pt.  - BENIGN    HX CATARACT REMOVAL Bilateral     HX COLONOSCOPY      SEVERAL    HX OTHER SURGICAL      skin cancer excision face multiple times, right lower eyelid    HX TONSILLECTOMY      HX TOTAL ABDOMINAL HYSTERECTOMY      only    VASCULAR SURGERY PROCEDURE UNLIST Right 12/2013    RIGHT LOWER LEG STENT     VASCULAR SURGERY PROCEDURE UNLIST Right 7-2014    bypass graft right leg      Family history:   Family History   Problem Relation Age of Onset    Cancer Son         BURKITT LYMPHOMA    Hypertension Mother     Cancer Father         COLON    Diabetes Maternal Uncle     Cancer Sister         SKIN    Stroke Sister     Cancer Sister         SKIN    Anesth Problems Neg Hx       Social history:   Social History     Socioeconomic History    Marital status:      Spouse name: Not on file    Number of children: Not on file    Years of education: Not on file    Highest education level: Not on file   Occupational History    Not on file   Social Needs    Financial resource strain: Not on file    Food insecurity:     Worry: Not on file     Inability: Not on file    Transportation needs:     Medical: Not on file     Non-medical: Not on file   Tobacco Use    Smoking status: Current Every Day Smoker     Packs/day: 0.50     Years: 35.00     Pack years: 17.50    Smokeless tobacco: Never Used   Substance and Sexual Activity    Alcohol use: Yes     Comment: VERY OCC    Drug use: No    Sexual activity: Not on file   Lifestyle    Physical activity:     Days per week: Not on file     Minutes per session: Not on file    Stress: Not on file   Relationships    Social connections:     Talks on phone: Not on file     Gets together: Not on file     Attends [employer] service: Not on file     Active member of club or organization: Not on file     Attends meetings of clubs or organizations: Not on file     Relationship status: Not on file    Intimate partner violence:     Fear of current or ex partner: Not on file     Emotionally abused: Not on file     Physically abused: Not on file     Forced sexual activity: Not on file   Other Topics Concern    Not on file   Social History Narrative    ** Merged History Encounter **           Home Medications:   Prior to Admission medications    Medication Sig Start Date End Date Taking? Authorizing Provider   aspirin delayed-release 81 mg tablet Take 81 mg by mouth daily. Yes Provider, Historical   losartan (COZAAR) 50 mg tablet Take 1 Tab by mouth daily. 4/10/19  Yes Shantel Dickinson MD   atorvastatin (LIPITOR) 10 mg tablet TAKE 1 TABLET DAILY 1/30/19  Yes Shantel Dickinson MD      Allergies:    Allergies   Allergen Reactions    Ace Inhibitors Cough     Patient states that she is \"unaware of this allergy\"    Indocin [Indomethacin] Palpitations    Indocin [Indomethacin] Unknown (comments)    Nsaids (Non-Steroidal Anti-Inflammatory Drug) Other (comments)     \"Make her feel funny\"    Sulfa (Sulfonamide Antibiotics) Palpitations    Sulfa Dyne Other (comments)     ALMOST PASSED OUT, pt not aware of this allergy      Review of systems:  Denies headache, fever, chills, weight change, congestion, sore throat, chest pain, shortness of breath, nausea, vomiting, diarrhea, constipation, abdominal pain, generalized weakness, muscle or joint pain, and rash. Physical Exam:  Vitals: Blood pressure 179/69, pulse 64, temperature 98.3 °F (36.8 °C), resp. rate 18, height 5' 3\" (1.6 m), weight 48.4 kg (106 lb 11.2 oz), SpO2 97 %. General: awake and alert, NAD  Neck: supple  Breasts:  no known breast pathology  Cor: RRR  Lungs: clear  Abdomen: soft, non-tender, non-distended  Extremities: no edema  Skin: squamous cell carcinoma, probable on left forearm, multifocal and probable on left upper arm.     Impression: NEOPLASM OF UNCERTAIN BEHAVIOR, probably multifocal SCCA of skin of left arm    Plan:  Procedure(s):  PROBABLE SQUAMOUS CELL CARCINOMA LEFT FOREARM AND LEFT UPPER ARM WITH FROZEN SECTIONS, FLAP CLOSURE AND REPAIR

## 2020-01-06 NOTE — PERIOP NOTES
Discharge instructions reviewed with patient and spouse. Opportunity for questions and clarification provided. Patient and spouse verbalized understanding of discharge instructions and had no additional questions or concerns. Patient's vital signs within normal limits. Patient denies post-operative pain. Patient tolerating PO intake, denies nausea. Peripheral IV removed with no signs of infiltration. Patient voiding    Patient discharged by RN via wheelchair to spouse's care/car and prior home environment from Phase 1 area.

## 2020-01-06 NOTE — DISCHARGE INSTRUCTIONS
Blessing Pickett M.D., F.A.C.S. Hyun Clayton M.D., F.A.C.S.,  Mahsa Johnson III, M.D., F.A.C.S. Riaz Shah M.D.,  Myke Laurent M.D. Instructions after Minor Plastic Surgery Procedures      You have had a minor surgical procedure. Please follow these simple instructions to help ensure a safe and comfortable recovery. Any questions can be directed to the office at [phone]. 1. If a bandage has been placed, it can be removed or changed at 24-48 hours after surgery. Wounds of the scalp are not usually bandaged, except in special situations. 2. Expect a modest amount of redness (inflammation) and possibly some bruising to appear in the days following your surgery. This is normal and will resolve as the wound heals, but may persist until the stitches have been removed. 3. The appearance of excessive wound redness, pus or fever is not normal and should be reported to the office. 4. Wounds may be gently washed with mild soap and water beginning 24 hours after surgery, then patted dry. Scalp wounds may be gently washed (mild shampoo) and gently dried as well. 5. You have glue on your incisions which will protect them. No need for a bandage after 24 hours. The glue will fall off in about two weeks. 6. Suture removal will be arranged in 5-14 days, depending upon the site. The pathologists report will be discussed with you then. Your appointment is _______________________. 7. Mild to moderate pain is to be expected after minor surgery and will generally be relieved by the use of Tylenol or ibuprofen agents (Advil, Motrin, Nuprin, etc.) You have been given a prescription for ______________________. 8. Swelling or discomfort will be improved by elevating the surgical site above the level of the heart, especially the face, scalp or arms, which can be elevated in bed by extra pillows.     9. Do not be concerned if one or even several sutures come out prior to the time of suture removal. It is not unusual for this to occur as the wound swelling decreases. Support of the remaining sutures is sufficient to protect your wound(s). 10. If appropriate, copies of the surgery and pathology reports will be sent to your doctor. 11. Please call the office at [phone] if you have any questions. I acknowledge receipt of the above discharge instructions:    Patient/Guardian Signature _______________________________________ Date___________________    Anny [place] Signature_______________________________________________ Date___________________      DISCHARGE SUMMARY from Nurse    You received 650 mg of tylenol (acetaminophen) prior to your procedure today at 8:15 AM. Please do not have any additional tylenol (acetaminophen) or tylenol containing products for 6 hours, or no sooner than 2:15 PM.     PATIENT INSTRUCTIONS:    After general anesthesia or intravenous sedation, for 24 hours or while taking prescription Narcotics:  · Limit your activities  · Do not drive and operate hazardous machinery  · Do not make important personal or business decisions  · Do  not drink alcoholic beverages  · If you have not urinated within 8 hours after discharge, please contact your surgeon on call. Report the following to your surgeon:  · Excessive pain, swelling, redness or odor of or around the surgical area  · Temperature over 101  · Nausea and vomiting lasting longer than 4 hours or if unable to take medications  · Any signs of decreased circulation or nerve impairment to extremity: change in color, persistent  numbness, tingling, coldness or increase pain  · Any questions  If you experience any of the following symptoms as noted above, please follow up with Dr. Alison Rubin. What to do at Home:  Recommended activity: See surgical instructions above from Dr. Alison Rubin  Recommended Diet: Resume regular diet as tolerated. Nothing heavy, greasy, fatty, or fried.  Please make sure you have food on your stomach prior to taking narcotic pain medication. *  Please give a list of your current medications to your Primary Care Provider. *  Please update this list whenever your medications are discontinued, doses are changed, or new medications (including over-the-counter products) are added. *  Please carry medication information at all times in case of emergency situations. Community Education:    These are general instructions for a healthy lifestyle:    No smoking/ No tobacco products/ Avoid exposure to second hand smoke. Surgeon General's Warning:  Quitting smoking now greatly reduces serious risk to your health. Obesity, smoking, and sedentary lifestyle greatly increases your risk for illness. A healthy diet, regular physical exercise & weight monitoring are important for maintaining a healthy lifestyle    You may be retaining fluid if you have a history of heart failure or if you experience any of the following symptoms:  Weight gain of 3 pounds or more overnight or 5 pounds in a week, increased swelling in our hands or feet or shortness of breath while lying flat in bed. Please call your doctor as soon as you notice any of these symptoms; do not wait until your next office visit. The discharge information has been reviewed with the patient and caregiver. The patient and caregiver verbalized understanding. Discharge medications reviewed with the patient and caregiver and appropriate educational materials and side effects teaching were provided.   ___________________________________________________________________________________________________________________________________

## 2020-01-06 NOTE — BRIEF OP NOTE
BRIEF OPERATIVE NOTE    Date of Procedure: 1/6/2020   Preoperative Diagnosis: NEOPLASM OF UNCERTAIN BEHAVIOR  Postoperative Diagnosis: NEOPLASM OF UNCERTAIN BEHAVIOR    Procedure(s):  PROBABLE SQUAMOUS CELL CARCINOMA LEFT FOREARM AND LEFT UPPER ARM WITH FROZEN SECTIONS, FLAP CLOSURE AND REPAIR  Surgeon(s) and Role:     * Ambrosio Guzmán MD - Primary         Surgical Assistant: Elizabeth Rodriguez    Surgical Staff:  Circ-1: Sawyer Bangura RN  Scrub RN-1: Dyllan Bess RN  Surg Asst-1: Marty DUFFY  Event Time In Time Out   Incision Start 8002    Incision Close 1006      Anesthesia: MAC   Estimated Blood Loss: minimal  Specimens:   ID Type Source Tests Collected by Time Destination   1 : PROBABLE MULTI FOCAL SQUAMOUS CELL CARCINOMA LEFT FOREARM Frozen Section Arm, Left  Ambrosio Guzmán MD 1/6/2020 0930 Pathology   2 : PROBABLE BASAL CELL CARCINOMA LEFT UPPER ARM Fresh Arm, Left  Ambrosio Guzmán MD 1/6/2020 5802 Pathology      Findings: clear margins on SCCA forearm    Complications: none  Implants: * No implants in log *

## 2020-02-27 ENCOUNTER — HOSPITAL ENCOUNTER (OUTPATIENT)
Dept: PREADMISSION TESTING | Age: 80
Discharge: HOME | End: 2020-02-27
Payer: MEDICARE

## 2020-02-27 VITALS
DIASTOLIC BLOOD PRESSURE: 79 MMHG | WEIGHT: 109.5 LBS | HEART RATE: 61 BPM | SYSTOLIC BLOOD PRESSURE: 218 MMHG | HEIGHT: 62 IN | TEMPERATURE: 97.9 F | RESPIRATION RATE: 14 BRPM | BODY MASS INDEX: 20.15 KG/M2

## 2020-02-27 LAB
BASOPHILS # BLD: 0.1 K/UL (ref 0–0.1)
BASOPHILS NFR BLD: 1 % (ref 0–1)
DIFFERENTIAL METHOD BLD: NORMAL
EOSINOPHIL # BLD: 0.1 K/UL (ref 0–0.4)
EOSINOPHIL NFR BLD: 2 % (ref 0–7)
ERYTHROCYTE [DISTWIDTH] IN BLOOD BY AUTOMATED COUNT: 12.6 % (ref 11.5–14.5)
HCT VFR BLD AUTO: 41.4 % (ref 35–47)
HGB BLD-MCNC: 13.9 G/DL (ref 11.5–16)
IMM GRANULOCYTES # BLD AUTO: 0 K/UL (ref 0–0.04)
IMM GRANULOCYTES NFR BLD AUTO: 0 % (ref 0–0.5)
LYMPHOCYTES # BLD: 2.2 K/UL (ref 0.8–3.5)
LYMPHOCYTES NFR BLD: 27 % (ref 12–49)
MCH RBC QN AUTO: 31.5 PG (ref 26–34)
MCHC RBC AUTO-ENTMCNC: 33.6 G/DL (ref 30–36.5)
MCV RBC AUTO: 93.9 FL (ref 80–99)
MONOCYTES # BLD: 0.7 K/UL (ref 0–1)
MONOCYTES NFR BLD: 9 % (ref 5–13)
NEUTS SEG # BLD: 5.1 K/UL (ref 1.8–8)
NEUTS SEG NFR BLD: 61 % (ref 32–75)
NRBC # BLD: 0 K/UL (ref 0–0.01)
NRBC BLD-RTO: 0 PER 100 WBC
PLATELET # BLD AUTO: 235 K/UL (ref 150–400)
PMV BLD AUTO: 10.9 FL (ref 8.9–12.9)
RBC # BLD AUTO: 4.41 M/UL (ref 3.8–5.2)
WBC # BLD AUTO: 8.2 K/UL (ref 3.6–11)

## 2020-02-27 PROCEDURE — 85025 COMPLETE CBC W/AUTO DIFF WBC: CPT

## 2020-02-27 PROCEDURE — 36415 COLL VENOUS BLD VENIPUNCTURE: CPT

## 2020-02-27 PROCEDURE — 93005 ELECTROCARDIOGRAM TRACING: CPT

## 2020-02-27 NOTE — PERIOP NOTES
Preoperative instructions reviewed with patient. Patient given SSI infection FAQS sheet. Given two bottles of skin prep chlorhexidine soap; given written and verbal instructions on use. Patient was given the opportunity to ask questions on the information provided. Patient's BP was 205/64 upon arrival to PAT department. Denies any SOB, chest pain, dizziness, or headache. Skin warm and dry. Re-checked BP during appointment and results were as follows:  211/68 and 218/79. Dr. Mary Ann Lozano office notified; I spoke with Sabina Trevizo. Sabina Trevizo would like patient to be seen in office today at [phone]. Patient's sister, Aaron Arellano, is here with patient and she will drive patient to Dr. Mary Ann Lozano office. EKG faxed to Dr. Mary Ann Lozano office per [facility] request.  Patient and her sister are on their way to office now.

## 2020-02-28 LAB
ATRIAL RATE: 61 BPM
CALCULATED P AXIS, ECG09: 59 DEGREES
CALCULATED R AXIS, ECG10: -5 DEGREES
CALCULATED T AXIS, ECG11: 49 DEGREES
DIAGNOSIS, 93000: NORMAL
P-R INTERVAL, ECG05: 186 MS
Q-T INTERVAL, ECG07: 446 MS
QRS DURATION, ECG06: 84 MS
QTC CALCULATION (BEZET), ECG08: 448 MS
VENTRICULAR RATE, ECG03: 61 BPM

## 2020-03-16 ENCOUNTER — HOSPITAL ENCOUNTER (OUTPATIENT)
Age: 80
Setting detail: OUTPATIENT SURGERY
Discharge: HOME | End: 2020-03-16
Attending: SURGERY | Admitting: SURGERY
Payer: MEDICARE

## 2020-03-16 ENCOUNTER — ANESTHESIA (OUTPATIENT)
Dept: MEDSURG UNIT | Age: 80
End: 2020-03-16
Payer: MEDICARE

## 2020-03-16 ENCOUNTER — ANESTHESIA EVENT (OUTPATIENT)
Dept: MEDSURG UNIT | Age: 80
End: 2020-03-16
Payer: MEDICARE

## 2020-03-16 VITALS
BODY MASS INDEX: 20.06 KG/M2 | HEART RATE: 60 BPM | TEMPERATURE: 98.2 F | WEIGHT: 109 LBS | OXYGEN SATURATION: 97 % | HEIGHT: 62 IN | RESPIRATION RATE: 18 BRPM | DIASTOLIC BLOOD PRESSURE: 73 MMHG | SYSTOLIC BLOOD PRESSURE: 167 MMHG

## 2020-03-16 PROCEDURE — 77030018836 HC SOL IRR NACL ICUM -A: Performed by: SURGERY

## 2020-03-16 PROCEDURE — 74011250636 HC RX REV CODE- 250/636: Performed by: NURSE ANESTHETIST, CERTIFIED REGISTERED

## 2020-03-16 PROCEDURE — 77030002986 HC SUT PROL J&J -A: Performed by: SURGERY

## 2020-03-16 PROCEDURE — 77030040922 HC BLNKT HYPOTHRM STRY -A

## 2020-03-16 PROCEDURE — 88331 PATH CONSLTJ SURG 1 BLK 1SPC: CPT

## 2020-03-16 PROCEDURE — 74011000250 HC RX REV CODE- 250: Performed by: NURSE ANESTHETIST, CERTIFIED REGISTERED

## 2020-03-16 PROCEDURE — 77030011640 HC PAD GRND REM COVD -A: Performed by: SURGERY

## 2020-03-16 PROCEDURE — 88305 TISSUE EXAM BY PATHOLOGIST: CPT

## 2020-03-16 PROCEDURE — 76030000001 HC AMB SURG OR TIME 1 TO 1.5: Performed by: SURGERY

## 2020-03-16 PROCEDURE — 77030031139 HC SUT VCRL2 J&J -A: Performed by: SURGERY

## 2020-03-16 PROCEDURE — 77030002996 HC SUT SLK J&J -A: Performed by: SURGERY

## 2020-03-16 PROCEDURE — 76210000034 HC AMBSU PH I REC 0.5 TO 1 HR: Performed by: SURGERY

## 2020-03-16 PROCEDURE — 74011000250 HC RX REV CODE- 250: Performed by: SURGERY

## 2020-03-16 PROCEDURE — 76210000050 HC AMBSU PH II REC 0.5 TO 1 HR: Performed by: SURGERY

## 2020-03-16 PROCEDURE — 76060000062 HC AMB SURG ANES 1 TO 1.5 HR: Performed by: SURGERY

## 2020-03-16 RX ORDER — DEXAMETHASONE SODIUM PHOSPHATE 4 MG/ML
INJECTION, SOLUTION INTRA-ARTICULAR; INTRALESIONAL; INTRAMUSCULAR; INTRAVENOUS; SOFT TISSUE AS NEEDED
Status: DISCONTINUED | OUTPATIENT
Start: 2020-03-16 | End: 2020-03-16 | Stop reason: HOSPADM

## 2020-03-16 RX ORDER — CEFAZOLIN SODIUM 1 G/3ML
INJECTION, POWDER, FOR SOLUTION INTRAMUSCULAR; INTRAVENOUS AS NEEDED
Status: DISCONTINUED | OUTPATIENT
Start: 2020-03-16 | End: 2020-03-16 | Stop reason: HOSPADM

## 2020-03-16 RX ORDER — GLYCOPYRROLATE 0.2 MG/ML
INJECTION INTRAMUSCULAR; INTRAVENOUS AS NEEDED
Status: DISCONTINUED | OUTPATIENT
Start: 2020-03-16 | End: 2020-03-16 | Stop reason: HOSPADM

## 2020-03-16 RX ORDER — SODIUM CHLORIDE, SODIUM LACTATE, POTASSIUM CHLORIDE, CALCIUM CHLORIDE 600; 310; 30; 20 MG/100ML; MG/100ML; MG/100ML; MG/100ML
INJECTION, SOLUTION INTRAVENOUS
Status: DISCONTINUED | OUTPATIENT
Start: 2020-03-16 | End: 2020-03-16 | Stop reason: HOSPADM

## 2020-03-16 RX ORDER — LIDOCAINE HYDROCHLORIDE AND EPINEPHRINE 10; 10 MG/ML; UG/ML
1.5 INJECTION, SOLUTION INFILTRATION; PERINEURAL ONCE
Status: COMPLETED | OUTPATIENT
Start: 2020-03-16 | End: 2020-03-16

## 2020-03-16 RX ORDER — BACITRACIN 500 [USP'U]/G
OINTMENT TOPICAL ONCE
Status: COMPLETED | OUTPATIENT
Start: 2020-03-16 | End: 2020-03-16

## 2020-03-16 RX ORDER — FENTANYL CITRATE 50 UG/ML
INJECTION, SOLUTION INTRAMUSCULAR; INTRAVENOUS AS NEEDED
Status: DISCONTINUED | OUTPATIENT
Start: 2020-03-16 | End: 2020-03-16 | Stop reason: HOSPADM

## 2020-03-16 RX ORDER — PHENYLEPHRINE HCL IN 0.9% NACL 0.4MG/10ML
SYRINGE (ML) INTRAVENOUS AS NEEDED
Status: DISCONTINUED | OUTPATIENT
Start: 2020-03-16 | End: 2020-03-16 | Stop reason: HOSPADM

## 2020-03-16 RX ORDER — ONDANSETRON 2 MG/ML
INJECTION INTRAMUSCULAR; INTRAVENOUS AS NEEDED
Status: DISCONTINUED | OUTPATIENT
Start: 2020-03-16 | End: 2020-03-16 | Stop reason: HOSPADM

## 2020-03-16 RX ORDER — PROPOFOL 10 MG/ML
INJECTION, EMULSION INTRAVENOUS AS NEEDED
Status: DISCONTINUED | OUTPATIENT
Start: 2020-03-16 | End: 2020-03-16 | Stop reason: HOSPADM

## 2020-03-16 RX ORDER — PROPOFOL 10 MG/ML
INJECTION, EMULSION INTRAVENOUS
Status: DISCONTINUED | OUTPATIENT
Start: 2020-03-16 | End: 2020-03-16 | Stop reason: HOSPADM

## 2020-03-16 RX ADMIN — PROPOFOL 10 MG: 10 INJECTION, EMULSION INTRAVENOUS at 07:46

## 2020-03-16 RX ADMIN — PROPOFOL 25 MCG/KG/MIN: 10 INJECTION, EMULSION INTRAVENOUS at 07:52

## 2020-03-16 RX ADMIN — FENTANYL CITRATE 25 MCG: 50 INJECTION, SOLUTION INTRAMUSCULAR; INTRAVENOUS at 08:11

## 2020-03-16 RX ADMIN — Medication 120 MCG: at 08:07

## 2020-03-16 RX ADMIN — GLYCOPYRROLATE 0.2 MG: 0.2 INJECTION INTRAMUSCULAR; INTRAVENOUS at 08:07

## 2020-03-16 RX ADMIN — CEFAZOLIN 1 G: 330 INJECTION, POWDER, FOR SOLUTION INTRAMUSCULAR; INTRAVENOUS at 07:44

## 2020-03-16 RX ADMIN — PROPOFOL 10 MG: 10 INJECTION, EMULSION INTRAVENOUS at 07:47

## 2020-03-16 RX ADMIN — SODIUM CHLORIDE, POTASSIUM CHLORIDE, SODIUM LACTATE AND CALCIUM CHLORIDE: 600; 310; 30; 20 INJECTION, SOLUTION INTRAVENOUS at 07:30

## 2020-03-16 RX ADMIN — DEXAMETHASONE SODIUM PHOSPHATE 4 MG: 4 INJECTION, SOLUTION INTRAMUSCULAR; INTRAVENOUS at 08:06

## 2020-03-16 RX ADMIN — PROPOFOL 10 MG: 10 INJECTION, EMULSION INTRAVENOUS at 07:50

## 2020-03-16 RX ADMIN — PROPOFOL 50 MG: 10 INJECTION, EMULSION INTRAVENOUS at 07:45

## 2020-03-16 RX ADMIN — ONDANSETRON HYDROCHLORIDE 4 MG: 2 INJECTION, SOLUTION INTRAMUSCULAR; INTRAVENOUS at 08:11

## 2020-03-16 RX ADMIN — PROPOFOL 10 MG: 10 INJECTION, EMULSION INTRAVENOUS at 08:02

## 2020-03-16 RX ADMIN — PROPOFOL 10 MG: 10 INJECTION, EMULSION INTRAVENOUS at 08:05

## 2020-03-16 RX ADMIN — PROPOFOL 20 MG: 10 INJECTION, EMULSION INTRAVENOUS at 07:58

## 2020-03-16 RX ADMIN — FENTANYL CITRATE 25 MCG: 50 INJECTION, SOLUTION INTRAMUSCULAR; INTRAVENOUS at 08:21

## 2020-03-16 RX ADMIN — PROPOFOL 10 MG: 10 INJECTION, EMULSION INTRAVENOUS at 07:52

## 2020-03-16 NOTE — H&P
Pre-op History and Physical    CC: SQUAMOUS CELL CARCINOMA LEFT AND RIGHT LIMB   HPI: 78y.o. year old female with SQUAMOUS CELL CARCINOMA LEFT AND RIGHT LIMB for Procedure(s):  EXCISION PROBABLE SQUAMOUS CELL CARCINOMA RIGHT DORSAL HAND AND RIGHT INDEX FINGER WITH FLAP CLOSURE POSSIBLE FULL THICKNESS SKIN GRAFT TO RIGHT INDEX FINGER, eXCISION PROBABLE SQUAMOUS CELL CARCINOMA X2 LEFT LOWER LEG WITH FASCIOCUTANEOUS FLAP CLOSURE WITH FROZEN SECTION. Past medical history:   Past Medical History:   Diagnosis Date    Cancer ([facility]     BCCA; SCCA    High cholesterol     History of colon polyps     HTN (hypertension) 7/16/2013    IBS (irritable bowel syndrome)     Psychiatric disorder     HX PANIC ATTACKS    PVD (peripheral vascular disease) ([facility] 7/16/2013      Past surgical history:   Past Surgical History:   Procedure Laterality Date    HX BREAST BIOPSY Bilateral     Surgical Bx - Many yrs ago per pt.  - BENIGN    HX CATARACT REMOVAL Bilateral     HX COLONOSCOPY      SEVERAL    HX HYSTERECTOMY      HX OTHER SURGICAL      skin cancer excision face multiple times, right lower eyelid    HX TONSILLECTOMY      HX TOTAL ABDOMINAL HYSTERECTOMY      only    VASCULAR SURGERY PROCEDURE UNLIST Right 12/2013    RIGHT LOWER LEG STENT     VASCULAR SURGERY PROCEDURE UNLIST Right 7-2014    bypass graft right leg    VASCULAR SURGERY PROCEDURE UNLIST  10/22/2019    ANGIO-SEAL LEFT FEMORAL ARTERY      Family history:   Family History   Problem Relation Age of Onset    Cancer Son         BURKITT LYMPHOMA    Hypertension Mother     Cancer Father         COLON    Diabetes Maternal Uncle     Cancer Sister         SKIN    Stroke Sister     Cancer Sister         SKIN    Anesth Problems Neg Hx       Social history:   Social History     Socioeconomic History    Marital status:      Spouse name: Not on file    Number of children: Not on file    Years of education: Not on file    Highest education level: Not on file   Occupational History    Not on file   Social Needs    Financial resource strain: Not on file    Food insecurity     Worry: Not on file     Inability: Not on file    Transportation needs     Medical: Not on file     Non-medical: Not on file   Tobacco Use    Smoking status: Current Every Day Smoker     Packs/day: 1.00     Years: 35.00     Pack years: 35.00    Smokeless tobacco: Never Used   Substance and Sexual Activity    Alcohol use: Yes     Comment: VERY OCC    Drug use: No    Sexual activity: Not on file   Lifestyle    Physical activity     Days per week: Not on file     Minutes per session: Not on file    Stress: Not on file   Relationships    Social connections     Talks on phone: Not on file     Gets together: Not on file     Attends [employer] service: Not on file     Active member of club or organization: Not on file     Attends meetings of clubs or organizations: Not on file     Relationship status: Not on file    Intimate partner violence     Fear of current or ex partner: Not on file     Emotionally abused: Not on file     Physically abused: Not on file     Forced sexual activity: Not on file   Other Topics Concern    Not on file   Social History Narrative    ** Merged History Encounter **           Home Medications:   Prior to Admission medications    Medication Sig Start Date End Date Taking? Authorizing Provider   losartan (COZAAR) 100 mg tablet Take 1 Tab by mouth daily. 2/27/20  Yes Nemo Karimi MD   atorvastatin (LIPITOR) 10 mg tablet TAKE 1 TABLET DAILY 2/27/20  Yes Nemo Karimi MD   aspirin delayed-release 81 mg tablet Take 81 mg by mouth daily. Yes Provider, Historical      Allergies:    Allergies   Allergen Reactions    Ace Inhibitors Cough     Patient states that she is \"unaware of this allergy\"    Indocin [Indomethacin] Palpitations    Indocin [Indomethacin] Unknown (comments)    Nsaids (Non-Steroidal Anti-Inflammatory Drug) Other (comments) \"Make her feel funny\"    Sulfa (Sulfonamide Antibiotics) Palpitations    Sulfa Kalpana Other (comments)     ALMOST PASSED OUT, pt not aware of this allergy      Review of systems:  Denies headache, fever, chills, weight change, congestion, sore throat, chest pain, shortness of breath, nausea, vomiting, diarrhea, constipation, abdominal pain, generalized weakness, muscle or joint pain, and rash. Physical Exam:  Vitals: Blood pressure 164/71, pulse 63, temperature 97.9 °F (36.6 °C), resp. rate 12, height 5' 2\" (1.575 m), weight 49.4 kg (109 lb), SpO2 96 %. General: awake and alert, NAD  Neck: supple  Breasts:  no known breast pathology  Cor: RRR  Lungs: clear  Abdomen: soft, non-tender, non-distended  Extremities: no edema  Skin: squamous cell carcinoma    Impression: SQUAMOUS CELL CARCINOMA LEFT AND RIGHT LIMB    Plan:  Procedure(s):  EXCISION PROBABLE SQUAMOUS CELL CARCINOMA RIGHT DORSAL HAND AND RIGHT INDEX FINGER WITH FLAP CLOSURE POSSIBLE FULL THICKNESS SKIN GRAFT TO RIGHT INDEX FINGER, eXCISION PROBABLE SQUAMOUS CELL CARCINOMA X2 LEFT LOWER LEG WITH FASCIOCUTANEOUS FLAP CLOSURE WITH FROZEN SECTION     The index finger lesion seems to have gone away. We will treat the lesions on her right hand and left leg.

## 2020-03-16 NOTE — ROUTINE PROCESS
Patient: Bianka Laurent MRN: [id number]  SSN: [id number] YOB: 1940  Age: 78 y.o. Sex: female Patient is status post Procedure(s) with comments: 
EXCISION PROBABLE SQUAMOUS CELL CARCINOMA RIGHT DORSAL HAND WITH FLAP CLOSURE, , EXCISION PROBABLE SQUAMOUS CELL CARCINOMA X2 LEFT LOWER LEG WITH FASCIOCUTANEOUS FLAP CLOSURE WITH FROZEN SECTION - EXCISION PROBABLE SCCA ON RIGHT HAND & LEFT LOWER LEG. Surgeon(s) and Role: * Darlene Sarmiento MD - Primary Local/Dose/Irrigation: see MAR Peripheral IV 03/16/20 Left;Posterior Arm (Active) Site Assessment Clean, dry, & intact 3/16/2020  7:10 AM  
Phlebitis Assessment 0 3/16/2020  7:10 AM  
Infiltration Assessment 0 3/16/2020  7:10 AM  
Dressing Status Clean, dry, & intact 3/16/2020  7:10 AM  
Dressing Type Transparent 3/16/2020  7:10 AM  
Hub Color/Line Status Pink 3/16/2020  7:10 AM  
Alcohol Cap Used Yes 3/16/2020  7:10 AM  
                 
 
 
 
Dressing/Packing:  Wound Hand Right-Dressing Type: 4 x 4;Gauze wrap (flora) (03/16/20 0700) Wound Leg Left; Lower-Dressing Type: 4 x 4;Gauze wrap (flora)(KERLIX ROLL) (03/16/20 0700) Splint/Cast:  ] Other:

## 2020-03-16 NOTE — ANESTHESIA PREPROCEDURE EVALUATION
Relevant Problems   No relevant active problems       Anesthetic History   No history of anesthetic complications            Review of Systems / Medical History  Patient summary reviewed, nursing notes reviewed and pertinent labs reviewed    Pulmonary  Within defined limits                 Neuro/Psych         Psychiatric history     Cardiovascular    Hypertension                   GI/Hepatic/Renal                Endo/Other  Within defined limits           Other Findings              Physical Exam    Airway  Mallampati: I  TM Distance: > 6 cm  Neck ROM: normal range of motion   Mouth opening: Normal     Cardiovascular    Rhythm: regular  Rate: normal         Dental  No notable dental hx       Pulmonary  Breath sounds clear to auscultation               Abdominal         Other Findings            Anesthetic Plan    ASA: 2  Anesthesia type: MAC          Induction: Intravenous  Anesthetic plan and risks discussed with: Patient

## 2020-03-16 NOTE — DISCHARGE INSTRUCTIONS
Kristy Ramos M.D., F.A.C.S. Mikala Werner M.D., F.A.C.S.,  Dexter Regalado III, M.D., F.A.C.S. Chau Alvarez M.D.,  Oren Mcgee M.D. Instructions after Minor Plastic Surgery Procedures      You have had a minor surgical procedure. Please follow these simple instructions to help ensure a safe and comfortable recovery. Any questions can be directed to the office at [phone]. 1. If a bandage has been placed, it can be removed or changed at 24-48 hours after surgery. Wounds of the scalp are not usually bandaged, except in special situations. 2. Expect a modest amount of redness (inflammation) and possibly some bruising to appear in the days following your surgery. This is normal and will resolve as the wound heals, but may persist until the stitches have been removed. 3. The appearance of excessive wound redness, pus or fever is not normal and should be reported to the office. 4. Wounds may be gently washed with mild soap and water beginning 24 hours after surgery, then patted dry. Scalp wounds may be gently washed (mild shampoo) and gently dried as well. 5. Antibiotic ointment should be lightly applied 2-3 times per day to any wound. Replace Band-Aid or other dressing as instructed. 6. Suture removal will be arranged in 5-14 days, depending upon the site. The pathologists report will be discussed with you then. Your appointment is _______________________. 7. Mild to moderate pain is to be expected after minor surgery and will generally be relieved by the use of Tylenol or ibuprofen agents (Advil, Motrin, Nuprin, etc.) You have been given a prescription for ______________________. 8. Swelling or discomfort will be improved by elevating the surgical site above the level of the heart, especially the face, scalp or arms, which can be elevated in bed by extra pillows.     9. Do not be concerned if one or even several sutures come out prior to the time of suture removal. It is not unusual for this to occur as the wound swelling decreases. Support of the remaining sutures is sufficient to protect your wound(s). 10. If appropriate, copies of the surgery and pathology reports will be sent to your doctor. 11. Please call the office at [phone] if you have any questions.     I acknowledge receipt of the above discharge instructions:    Patient/Guardian Signature _______________________________________ Date___________________    Suzanne Ready Signature_______________________________________________ Date___________________

## 2020-03-16 NOTE — BRIEF OP NOTE
BRIEF OPERATIVE NOTE    Date of Procedure: 3/16/2020   Preoperative Diagnosis: SQUAMOUS CELL CARCINOMA LEFT AND RIGHT LIMB  Postoperative Diagnosis: SQUAMOUS CELL CARCINOMA LEFT AND RIGHT LIMB    Procedure(s):  EXCISION  SQUAMOUS CELL CARCINOMA RIGHT DORSAL HAND WITH FLAP CLOSURE, , EXCISION PROBABLE SQUAMOUS CELL CARCINOMA X2 LEFT LOWER LEG WITH  FLAP CLOSURE times two. Surgeon(s) and Role:     * Irais Rodríguez MD - Primary         Surgical Assistant:  [facility]    Surgical Staff:  Circ-1: Brit Ashley RN  Scrub RN-1: Esme Lawson RN  Surg Asst-1: Karina Carrera RN  Event Time In   Incision Start 2168   Incision Close 7505     Anesthesia: MAC   Estimated Blood Loss: minimal  Specimens:   ID Type Source Tests Collected by Time Destination   1 : PROBABLE SCCA DORSUM RIGHT HAND Frozen , Right  Carlos Eduardo Guzmán MD 3/16/2020 0802 Pathology   2 : PROBABLE SCCA LEFT ANTERIOR LOWER LEG Fresh Leg, Left  Carlos Eduardo Guzmán MD 3/16/2020 0809 Pathology   3 : PROBABLE SCCA LEFT LOWER LATERAL LEG Fresh Leg, Left  Carlos Eduardo Guzmán MD 3/16/2020 3873 Pathology      Findings: clear margins   Complications: none  Implants: * No implants in log *

## 2020-03-17 NOTE — ANESTHESIA POSTPROCEDURE EVALUATION
Post-Anesthesia Evaluation and Assessment    Patient: Cate Cuellar MRN: [id number]  SSN: [id number]    YOB: 1940  Age: 78 y.o. Sex: female      I have evaluated the patient and they are stable and ready for discharge from the PACU. Cardiovascular Function/Vital Signs  Visit Vitals  /73   Pulse 60   Temp 36.8 °C (98.2 °F)   Resp 18   Ht 5' 2\" (1.575 m)   Wt 49.4 kg (109 lb)   SpO2 97%   BMI 19.94 kg/m²       Patient is status post MAC anesthesia for Procedure(s) with comments:  EXCISION PROBABLE SQUAMOUS CELL CARCINOMA RIGHT DORSAL HAND WITH FLAP CLOSURE, , EXCISION PROBABLE SQUAMOUS CELL CARCINOMA X2 LEFT LOWER LEG WITH FASCIOCUTANEOUS FLAP CLOSURE WITH FROZEN SECTION - EXCISION PROBABLE SCCA ON RIGHT HAND & LEFT LOWER LEG. Nausea/Vomiting: None    Postoperative hydration reviewed and adequate. Pain:  Pain Scale 1: Numeric (0 - 10) (03/16/20 1000)  Pain Intensity 1: 0 (03/16/20 1000)   Managed    Neurological Status:   Neuro (WDL): Within Defined Limits (03/16/20 0935)  Neuro  Neurologic State: Alert (03/16/20 0935)  LUE Motor Response: Purposeful (03/16/20 0935)  LLE Motor Response: Purposeful (03/16/20 0935)  RUE Motor Response: Purposeful (03/16/20 0935)  RLE Motor Response: Purposeful (03/16/20 0935)   At baseline    Mental Status, Level of Consciousness: Alert and  oriented to person, place, and time    Pulmonary Status:   O2 Device: Room air (03/16/20 1000)   Adequate oxygenation and airway patent    Complications related to anesthesia: None    Post-anesthesia assessment completed. No concerns    Signed By: Christophe Castillo MD     March 17, 2020              Procedure(s):  EXCISION PROBABLE SQUAMOUS CELL CARCINOMA RIGHT DORSAL HAND WITH FLAP CLOSURE, , EXCISION PROBABLE SQUAMOUS CELL CARCINOMA X2 LEFT LOWER LEG WITH FASCIOCUTANEOUS FLAP CLOSURE WITH FROZEN SECTION.     MAC    <BSHSIANPOST>    Vitals Value Taken Time   /51 3/16/2020  9:02 AM   Temp 36.8 °C (98.2 °F) 3/16/2020  9:02 AM   Pulse 76 3/16/2020  9:35 AM   Resp 20 3/16/2020  9:35 AM   SpO2 96 % 3/16/2020  9:35 AM

## 2020-03-19 NOTE — OP NOTES
[facility]  OPERATIVE REPORT    Name:  Bing Arizmendi  MR#:  [id number]  :  1940  ACCOUNT #:  [de-identified]  DATE OF SERVICE:  2020    PREOPERATIVE DIAGNOSES:  1.  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 2.  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 3.  Probable squamous cell carcinoma of the lateral left lower leg. POSTOPERATIVE DIAGNOSES:  1.  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 2.  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 3.  Probable squamous cell carcinoma of the lateral left lower leg. PROCEDURES PERFORMED:  1. Excision squamous cell carcinoma dorsum of the right hand with total area of excision measuring 5 x 1.5 cm with 0.3 margins around the multifocal squamous cell carcinoma and flap closure of 12 sq cm dorsal hand defect. 2.  Excision 1.5 x 1.5 cm probable squamous cell carcinoma of the anterior left lower leg with 3 mm margins and flap closure of 4 sq cm anterior lower leg defect. 3.  Excision 1.5 x 1.5 cm probable squamous cell carcinoma of the lateral left lower leg with 0.3 mm margins and flap closure of 4 sq cm lateral leg defect. SURGEON:  Matilde Ugarte MD    ASSISTANT:  Keshawn Cannon assist    ANESTHESIA:  MAC.    COMPLICATIONS:  none. SPECIMENS REMOVED:  See op note. IMPLANTS:  none. ESTIMATED BLOOD LOSS:  none. INDICATIONS:  This patient was brought to the operating room for surgical treatment of these 3 skin cancers. She has had a long history of skin cancer that I have treated her for over the years and has severely a chemically damaged skin. I elected to treat lesions only that are obvious squamous cells because of the nature of her skin disease and its severe nature.   Preoperatively, she was marked for surgery and the details of the planned procedure of excision of these probable skin cancers were discussed with her in detail including the scars which would result, the risk involved, the potential complications, the expected results and the postoperative recovery. She appeared to understand all these considerations. FINDINGS AND PROCEDURE:  The patient was brought to the operating room and sedation anesthesia was induced. Local anesthesia was induced around all 3 skin cancers with 1% lidocaine 1:100,000 epinephrine. The right hand and forearm circumferentially and the left leg circumferentially were prepped and draped into a sterile field. I began the procedure by excising the multifocal skin cancer from the dorsum of the right hand. This was located towards the radial side of the hand and there was an obvious nodular painful squamous cell at the base of the first web space with other what appeared to be squamous cells extending out towards the wrist.  The entire area of excision was about 6 x 2 cm. This was marked and sent for frozen section. Hemostasis was secured. Moist dressing was placed on the hand. I then turned my attention to the 2 squamous cells of the left leg. These were both elliptically excised and marked and sent for permanent pathologic examination. On both of them, advancement flaps were used for closure. On the anterior lesion, the advancement flap was from the medial side of the defect, and on the posterior one, the advancement flap was on the posterior side of the defect. Undermining was done to create a flap, which was brought into position. On both sides after careful skin trimming and tissue rearrangement, hemostasis was secured and the flap was secured in place with interrupted 3-0 Prolene sutures. The flaps appeared viable bilaterally. At this point, the frozen section on the hand returned squamous cell carcinoma with clear margins. Therefore, a proximally based flap of dorsal hand skin was designed, cut, raised, and rotated into position.   After careful skin trimming and tissue rearrangement, hemostasis was secured and the wounds were closed with interrupted 4-0 Vicryl in the deep dermis with buried knots and running and interrupted 4-0 Prolene in the skin. Sterile dressing was applied to all wounds. The patient awoke from the anesthetic and went to the recovery room in good condition. Final sponge and needle counts were reported to be correct. Blood loss for this operation was negligible.       Kaylee Lang MD      [id number]  D:  03/19/2020 6:15  T:  03/19/2020 7:11  JOB #:  [id number]

## 2020-10-19 ENCOUNTER — TRANSCRIBE ORDER (OUTPATIENT)
Dept: REGISTRATION | Age: 80
End: 2020-10-19

## 2020-10-19 ENCOUNTER — HOSPITAL ENCOUNTER (OUTPATIENT)
Dept: MAMMOGRAPHY | Age: 80
Discharge: HOME | End: 2020-10-19
Attending: INTERNAL MEDICINE
Payer: MEDICARE

## 2020-10-19 DIAGNOSIS — Z12.31 VISIT FOR SCREENING MAMMOGRAM: Primary | ICD-10-CM

## 2020-10-19 DIAGNOSIS — Z12.31 VISIT FOR SCREENING MAMMOGRAM: ICD-10-CM

## 2020-10-19 PROCEDURE — 77067 SCR MAMMO BI INCL CAD: CPT

## 2021-11-29 ENCOUNTER — HOSPITAL ENCOUNTER (OUTPATIENT)
Dept: ULTRASOUND IMAGING | Age: 81
Discharge: HOME | End: 2021-11-29
Attending: INTERNAL MEDICINE
Payer: MEDICARE

## 2021-11-29 DIAGNOSIS — R60.0 LEG EDEMA, RIGHT: ICD-10-CM

## 2021-11-29 PROBLEM — D69.2 SENILE PURPURA (HCC): Status: ACTIVE | Noted: 2021-11-29

## 2021-11-29 PROCEDURE — 93971 EXTREMITY STUDY: CPT

## (undated) DEVICE — PACK,EENT,TURBAN DRAPE,PK II: Brand: MEDLINE

## (undated) DEVICE — INTENDED FOR TISSUE SEPARATION, AND OTHER PROCEDURES THAT REQUIRE A SHARP SURGICAL BLADE TO PUNCTURE OR CUT.: Brand: BARD-PARKER ® CARBON RIB-BACK BLADES

## (undated) DEVICE — TRAY PREP DRY W/ PREM GLV 2 APPL 6 SPNG 2 UNDPD 1 OVERWRAP

## (undated) DEVICE — INFECTION CONTROL KIT SYS

## (undated) DEVICE — Device

## (undated) DEVICE — SYR 10ML LUER LOK 1/5ML GRAD --

## (undated) DEVICE — STERILE POLYISOPRENE POWDER-FREE SURGICAL GLOVES: Brand: PROTEXIS

## (undated) DEVICE — DRAPE,REIN 53X77,STERILE: Brand: MEDLINE

## (undated) DEVICE — SUTURE PROL SZ 3-0 L36IN NONABSORBABLE BLU L17MM RB-1 1/2 8558H

## (undated) DEVICE — REM POLYHESIVE ADULT PATIENT RETURN ELECTRODE: Brand: VALLEYLAB

## (undated) DEVICE — SOLUTION IV 1000ML 0.9% SOD CHL

## (undated) DEVICE — (D)PREP SKN CHLRAPRP APPL 26ML -- CONVERT TO ITEM 371833

## (undated) DEVICE — HANDLE LT SNAP ON ULT DURABLE LENS FOR TRUMPF ALC DISPOSABLE

## (undated) DEVICE — NEEDLE HYPO 25GA L1.5IN BVL ORIENTED ECLIPSE

## (undated) DEVICE — SUTURE PERMAHAND SZ 6-0 L18IN NONABSORBABLE BLK L11MM P-1 639G

## (undated) DEVICE — BANDAGE,GAUZE,BULKEE II,4.5"X4.1YD,STRL: Brand: MEDLINE

## (undated) DEVICE — SUTURE PERMA-HAND SZ 4-0 L18IN NONABSORBABLE BLK L13MM P-3 641G

## (undated) DEVICE — SUTURE VCRL SZ 4-0 L27IN ABSRB UD L26MM SH 1/2 CIR J415H

## (undated) DEVICE — DRAPE,LAPAROTOMY,T,PEDI,STERILE: Brand: MEDLINE

## (undated) DEVICE — SUTURE COAT VCRL SZ 4-0 L18IN ABSRB UD L19MM PS-2 1/2 CIR J496G

## (undated) DEVICE — DRAPE,EXTREMITY,89X128,STERILE: Brand: MEDLINE

## (undated) DEVICE — SPONGE GZ W4XL4IN COT 12 PLY TYP VII WVN C FLD DSGN

## (undated) DEVICE — SUTURE VCRL SZ 3-0 L27IN ABSRB UD L26MM SH 1/2 CIR J416H

## (undated) DEVICE — DRESSING,GAUZE,XEROFORM,CURAD,5"X9",ST: Brand: CURAD

## (undated) DEVICE — BANDAGE,GAUZE,CONFORMING,3"X75",STRL,LF: Brand: MEDLINE

## (undated) DEVICE — DERMABOND SKIN ADH 0.7ML -- DERMABOND ADVANCED 12/BX

## (undated) DEVICE — MASTISOL ADHESIVE LIQ 2/3ML

## (undated) DEVICE — SUTURE MCRYL SZ 4-0 L27IN ABSRB UD L19MM PS-2 1/2 CIR PRIM Y426H

## (undated) DEVICE — GARMENT,MEDLINE,DVT,INT,CALF,MED, GEN2: Brand: MEDLINE

## (undated) DEVICE — SUT PROL 3-0 36IN SH DA BLU --

## (undated) DEVICE — SUT SLK 2-0SH 30IN BLK --